# Patient Record
Sex: MALE | Race: WHITE
[De-identification: names, ages, dates, MRNs, and addresses within clinical notes are randomized per-mention and may not be internally consistent; named-entity substitution may affect disease eponyms.]

---

## 2017-04-17 ENCOUNTER — HOSPITAL ENCOUNTER (OUTPATIENT)
Dept: HOSPITAL 47 - RADCTMAIN | Age: 47
End: 2017-04-17
Payer: COMMERCIAL

## 2017-04-17 DIAGNOSIS — J45.40: Primary | ICD-10-CM

## 2017-04-17 PROCEDURE — 71275 CT ANGIOGRAPHY CHEST: CPT

## 2017-04-17 NOTE — CT
CT CHEST FOR PULMONARY EMBOLISM.

 

EXAMINATION TYPE: CT angio chest

 

DATE OF EXAM: 4/17/2017 12:10 PM

 

INDICATION: Moderate persistent asthma

 

CT DLP: 1230.5 mGycm, Automated exposure control for dose reduction was used.

 

CONTRAST: Patient injected with 100 mL of Omnipaque 350.

 

COMPARISON: NONE

 

TECHNIQUE: CT of the chest is performed on a spiral scan at 2 mm thick sections.  Study is performed 
with intravenous contrast timed for evaluation for pulmonary embolism.  This will limit additional po
rtions of the evaluation.  3-D MIP images reconstructed by the technologist are reviewed on the compu
ter in the coronal and sagittal planes. 

 

FINDINGS:

No persistent filling defects are evident to suggest an acute pulmonary embolism.

 

No mediastinal or hilar adenopathy enlarged by CT criteria is evident.  The ascending aorta diameter 
at the level of the main pulmonary artery is 4.1 cm.  The main pulmonary artery diameter at the bifur
cation is 2.2 cm.

 

Streak opacity within the right middle lobe is likely related atelectasis.

 

Limited CT section through the upper abdomen are unremarkable.

 

IMPRESSIONS:

1. No acute pulmonary embolism.

2. Prominent ascending thoracic aorta 4.1 cm

## 2017-06-29 ENCOUNTER — HOSPITAL ENCOUNTER (OUTPATIENT)
Dept: HOSPITAL 47 - LABWHC1 | Age: 47
Discharge: HOME | End: 2017-06-29
Payer: COMMERCIAL

## 2017-06-29 DIAGNOSIS — J45.50: Primary | ICD-10-CM

## 2017-06-29 LAB
ALP SERPL-CCNC: 99 U/L (ref 38–126)
ALT SERPL-CCNC: 45 U/L (ref 21–72)
ANION GAP SERPL CALC-SCNC: 9 MMOL/L
AST SERPL-CCNC: 22 U/L (ref 17–59)
BASOPHILS # BLD AUTO: 0.1 K/UL (ref 0–0.2)
BASOPHILS NFR BLD AUTO: 0 %
BUN SERPL-SCNC: 12 MG/DL (ref 9–20)
CALCIUM SPEC-MCNC: 9.7 MG/DL (ref 8.4–10.2)
CH: 28.1
CHCM: 33.4
CHLORIDE SERPL-SCNC: 104 MMOL/L (ref 98–107)
CO2 SERPL-SCNC: 28 MMOL/L (ref 22–30)
EOSINOPHIL # BLD AUTO: 0.1 K/UL (ref 0–0.7)
EOSINOPHIL NFR BLD AUTO: 1 %
ERYTHROCYTE [DISTWIDTH] IN BLOOD BY AUTOMATED COUNT: 4.48 M/UL (ref 4.3–5.9)
ERYTHROCYTE [DISTWIDTH] IN BLOOD: 14.8 % (ref 11.5–15.5)
GLUCOSE SERPL-MCNC: 87 MG/DL (ref 74–99)
HCT VFR BLD AUTO: 37.8 % (ref 39–53)
HDW: 2.91
HGB BLD-MCNC: 12.7 GM/DL (ref 13–17.5)
LUC NFR BLD AUTO: 1 %
LYMPHOCYTES # SPEC AUTO: 1.8 K/UL (ref 1–4.8)
LYMPHOCYTES NFR SPEC AUTO: 16 %
MCH RBC QN AUTO: 28.2 PG (ref 25–35)
MCHC RBC AUTO-ENTMCNC: 33.5 G/DL (ref 31–37)
MCV RBC AUTO: 84.2 FL (ref 80–100)
MONOCYTES # BLD AUTO: 0.6 K/UL (ref 0–1)
MONOCYTES NFR BLD AUTO: 5 %
NEUTROPHILS # BLD AUTO: 8.9 K/UL (ref 1.3–7.7)
NEUTROPHILS NFR BLD AUTO: 77 %
NON-AFRICAN AMERICAN GFR(MDRD): >60
POTASSIUM SERPL-SCNC: 4.1 MMOL/L (ref 3.5–5.1)
PROT SERPL-MCNC: 6.4 G/DL (ref 6.3–8.2)
SODIUM SERPL-SCNC: 141 MMOL/L (ref 137–145)
WBC # BLD AUTO: 0.16 10*3/UL
WBC # BLD AUTO: 11.6 K/UL (ref 3.8–10.6)
WBC (PEROX): 11.84

## 2017-06-29 PROCEDURE — 86003 ALLG SPEC IGE CRUDE XTRC EA: CPT

## 2017-06-29 PROCEDURE — 85025 COMPLETE CBC W/AUTO DIFF WBC: CPT

## 2017-06-29 PROCEDURE — 82785 ASSAY OF IGE: CPT

## 2017-06-29 PROCEDURE — 36415 COLL VENOUS BLD VENIPUNCTURE: CPT

## 2017-06-29 PROCEDURE — 85652 RBC SED RATE AUTOMATED: CPT

## 2017-06-29 PROCEDURE — 84439 ASSAY OF FREE THYROXINE: CPT

## 2017-06-29 PROCEDURE — 80053 COMPREHEN METABOLIC PANEL: CPT

## 2017-06-29 PROCEDURE — 84443 ASSAY THYROID STIM HORMONE: CPT

## 2017-06-29 PROCEDURE — 85008 BL SMEAR W/O DIFF WBC COUNT: CPT

## 2017-06-30 LAB
A ALTERNATA IGE QN: 8.34 KU/L (ref ?–0.35)
A FUMIGATUS IGE QN: 0.1 KU/L
A FUMIGATUS IGE QN: <0.35 KU/L (ref ?–0.35)
A FUMIGATUS IGE RAST: (no result)
C HERBARUM IGE QN: <0.35 KU/L (ref ?–0.35)
C HERBARUM IGE RAST: (no result)
CAT DANDER IGE QN: (no result)
CAT DANDER IGE QN: 2.11 KU/L (ref ?–0.35)
D FARINAE IGE QN: (no result)
D FARINAE IGE QN: 0.6 KU/L (ref ?–0.35)
D PTERONYSS IGE QN: 0.47 KU/L (ref ?–0.35)
D PTERONYSS IGE RAST: (no result)
DEPRECATED COMMON RAGWEED IGE RAST QL: (no result)
IGE SERPL-ACNC: 99.6 IU/ML (ref ?–114)
MAPLE IGE RAST: (no result)
MARSH ELDER IGE QN: (no result)
MARSH ELDER IGE QN: <0.35 KU/L (ref ?–0.35)
MISC ALLERGEN IGE RAST: (no result)
MOUSE URINE PROT IGE QN: <0.35 KU/L (ref ?–0.35)
MOUSE URINE PROT IGE RAST: (no result)
MT JUNIPER IGE QN: (no result)
MULBERRY IGE CLASS: (no result)
NETTLE IGE RAST: (no result)
P NOTATUM IGE QN: (no result)
SILVER BIRCH IGE RAST: (no result)
WHITE ASH IGE RAST: (no result)

## 2017-11-16 ENCOUNTER — HOSPITAL ENCOUNTER (OUTPATIENT)
Dept: HOSPITAL 47 - RADCTMAIN | Age: 47
Discharge: HOME | End: 2017-11-16
Payer: COMMERCIAL

## 2017-11-16 DIAGNOSIS — I71.2: Primary | ICD-10-CM

## 2017-11-16 DIAGNOSIS — M79.89: ICD-10-CM

## 2017-11-16 PROCEDURE — 93306 TTE W/DOPPLER COMPLETE: CPT

## 2017-11-16 PROCEDURE — 71275 CT ANGIOGRAPHY CHEST: CPT

## 2017-11-16 PROCEDURE — 93970 EXTREMITY STUDY: CPT

## 2017-11-17 NOTE — ECHOF
Referral Reason:R06.02 Dyspnea M79.89 Swelling in legs



MEASUREMENTS

--------

HEIGHT: 182.9 cm

WEIGHT: 145.1 kg

BP: 

RVIDd:   2.9 cm     (< 3.3)

IVSd:   1.1 cm     (0.6 - 1.1)

LVIDd:   5.5 cm     (3.9 - 5.3)

LVPWd:   1.0 cm     (0.6 - 1.1)

IVSs:   1.6 cm

LVIDs:   4.2 cm

LVPWs:   1.2 cm

LA Diam:   3.4 cm     (2.7 - 3.8)

LAESV Index (A-L):   28.60 ml/m

Ao Diam:   3.7 cm     (2.0 - 3.7)

AV Cusp:   2.5 cm     (1.5 - 2.6)

LA Diam:   3.9 cm     (2.7 - 3.8)

MV EXCURSION:   21.475 mm     (> 18.000)

MV EF SLOPE:   135 mm/s     (70 - 150)

EPSS:   0.6 cm

MV E Abrahan:   0.90 m/s

MV DecT:   234 ms

MV A Abrahan:   0.88 m/s

MV E/A Ratio:   1.02 

RAP:   5.00 mmHg

RVSP:   20.05 mmHg







FINDINGS

--------

Sinus rhythm.

Morbid Obesity

LV size, wall thickness and systolic function are normal, with an EF greater than 55%.   The left andry
tricular size is normal.

The right ventricle is normal in size.

Normal LA  size by volume 22+/-6 ml/m2.

The right atrial size is normal.

The aortic valve is trileaflet, and appears structurally normal. No aortic stenosis or regurgitation.


Mild mitral regurgitation is present.

Mild tricuspid regurgitation present.   There is no evidence of pulmonary hypertension.   The right v
entricular systolic pressure, as measured by Doppler, is 20.05mmHg.

There is no pulmonic regurgitation present.

The aortic root size is normal.

Echo free space represents a pericardial fat pad.



CONCLUSIONS

--------

1. Morbid Obesity

2. LV size, wall thickness and systolic function are normal, with an EF greater than 55%.

3. The left ventricular size is normal.

4. The aortic valve is trileaflet, and appears structurally normal. No aortic stenosis or regurgitati
on.

5. Mild mitral regurgitation is present.

6. Mild tricuspid regurgitation present.

7. There is no evidence of pulmonary hypertension.

8. The right ventricular systolic pressure, as measured by Doppler, is 20.05mmHg.

9. Echo free space represents a pericardial fat pad.





SONOGRAPHER: Latesha Constantino RDCS

## 2020-01-06 ENCOUNTER — HOSPITAL ENCOUNTER (OUTPATIENT)
Dept: HOSPITAL 47 - LABWHC1 | Age: 50
Discharge: HOME | End: 2020-01-06
Attending: PHYSICAL MEDICINE & REHABILITATION
Payer: COMMERCIAL

## 2020-01-06 DIAGNOSIS — M47.816: ICD-10-CM

## 2020-01-06 DIAGNOSIS — M43.16: ICD-10-CM

## 2020-01-06 DIAGNOSIS — M51.36: Primary | ICD-10-CM

## 2020-01-06 DIAGNOSIS — R20.2: ICD-10-CM

## 2020-01-06 DIAGNOSIS — M41.86: ICD-10-CM

## 2020-01-06 LAB
ALBUMIN SERPL-MCNC: 4.3 G/DL (ref 3.8–4.9)
ALBUMIN/GLOB SERPL: 2.15 G/DL (ref 1.6–3.17)
ALP SERPL-CCNC: 121 U/L (ref 41–126)
ALT SERPL-CCNC: 21 U/L (ref 10–49)
ANION GAP SERPL CALC-SCNC: 7.2 MMOL/L (ref 4–12)
ASO AB SER QL: 32 IU/ML (ref 0–200)
AST SERPL-CCNC: 17 U/L (ref 14–35)
BASOPHILS # BLD AUTO: 0 K/UL (ref 0–0.2)
BASOPHILS NFR BLD AUTO: 0 %
BUN SERPL-SCNC: 16 MG/DL (ref 9–27)
BUN/CREAT SERPL: 14.55 RATIO (ref 12–20)
CALCIUM SPEC-MCNC: 9.1 MG/DL (ref 8.7–10.3)
CCP IGG SERPL-ACNC: <0.5 U/ML
CHLORIDE SERPL-SCNC: 101 MMOL/L (ref 96–109)
CK SERPL-CCNC: 70 U/L (ref 35–257)
CO2 SERPL-SCNC: 29.8 MMOL/L (ref 21.6–31.8)
EOSINOPHIL # BLD AUTO: 0.2 K/UL (ref 0–0.7)
EOSINOPHIL NFR BLD AUTO: 2 %
ERYTHROCYTE [DISTWIDTH] IN BLOOD BY AUTOMATED COUNT: 5.41 M/UL (ref 4.3–5.9)
ERYTHROCYTE [DISTWIDTH] IN BLOOD: 13.9 % (ref 11.5–15.5)
GLOBULIN SER CALC-MCNC: 2 G/DL (ref 1.6–3.3)
GLUCOSE SERPL-MCNC: 96 MG/DL (ref 70–110)
HBA1C MFR BLD: 5.7 % (ref 4–6)
HCT VFR BLD AUTO: 43 % (ref 39–53)
HGB BLD-MCNC: 13.5 GM/DL (ref 13–17.5)
LDH SPEC-CCNC: 183 U/L (ref 120–246)
LYMPHOCYTES # SPEC AUTO: 1.8 K/UL (ref 1–4.8)
LYMPHOCYTES NFR SPEC AUTO: 20 %
MCH RBC QN AUTO: 24.9 PG (ref 25–35)
MCHC RBC AUTO-ENTMCNC: 31.3 G/DL (ref 31–37)
MCV RBC AUTO: 79.5 FL (ref 80–100)
MONOCYTES # BLD AUTO: 0.5 K/UL (ref 0–1)
MONOCYTES NFR BLD AUTO: 5 %
NEUTROPHILS # BLD AUTO: 6.3 K/UL (ref 1.3–7.7)
NEUTROPHILS NFR BLD AUTO: 70 %
PH UR: 6 [PH] (ref 5–8)
PLATELET # BLD AUTO: 238 K/UL (ref 150–450)
POTASSIUM SERPL-SCNC: 4.6 MMOL/L (ref 3.5–5.5)
PROT SERPL-MCNC: 6.3 G/DL (ref 6.2–8.2)
RHEUMATOID FACT SERPL-ACNC: 5 IU/ML (ref 0–15)
SODIUM SERPL-SCNC: 138 MMOL/L (ref 135–145)
SP GR UR: 1.02 (ref 1–1.03)
T4 FREE SERPL-MCNC: 1.1 NG/DL (ref 0.8–1.8)
URATE SERPL-MCNC: 6.6 MG/DL (ref 3.7–8.7)
UROBILINOGEN UR QL STRIP: <2 MG/DL (ref ?–2)
VIT B12 SERPL-MCNC: 532 PG/ML (ref 200–944)
WBC # BLD AUTO: 9 K/UL (ref 3.8–10.6)

## 2020-01-06 PROCEDURE — 83655 ASSAY OF LEAD: CPT

## 2020-01-06 PROCEDURE — 86812 HLA TYPING A B OR C: CPT

## 2020-01-06 PROCEDURE — 87390 HIV-1 AG IA: CPT

## 2020-01-06 PROCEDURE — 83970 ASSAY OF PARATHORMONE: CPT

## 2020-01-06 PROCEDURE — 83825 ASSAY OF MERCURY: CPT

## 2020-01-06 PROCEDURE — 84439 ASSAY OF FREE THYROXINE: CPT

## 2020-01-06 PROCEDURE — 80053 COMPREHEN METABOLIC PANEL: CPT

## 2020-01-06 PROCEDURE — 83036 HEMOGLOBIN GLYCOSYLATED A1C: CPT

## 2020-01-06 PROCEDURE — 84425 ASSAY OF VITAMIN B-1: CPT

## 2020-01-06 PROCEDURE — 83516 IMMUNOASSAY NONANTIBODY: CPT

## 2020-01-06 PROCEDURE — 84207 ASSAY OF VITAMIN B-6: CPT

## 2020-01-06 PROCEDURE — 86060 ANTISTREPTOLYSIN O TITER: CPT

## 2020-01-06 PROCEDURE — 82553 CREATINE MB FRACTION: CPT

## 2020-01-06 PROCEDURE — 86803 HEPATITIS C AB TEST: CPT

## 2020-01-06 PROCEDURE — 36415 COLL VENOUS BLD VENIPUNCTURE: CPT

## 2020-01-06 PROCEDURE — 82550 ASSAY OF CK (CPK): CPT

## 2020-01-06 PROCEDURE — 86038 ANTINUCLEAR ANTIBODIES: CPT

## 2020-01-06 PROCEDURE — 82306 VITAMIN D 25 HYDROXY: CPT

## 2020-01-06 PROCEDURE — 83615 LACTATE (LD) (LDH) ENZYME: CPT

## 2020-01-06 PROCEDURE — 86431 RHEUMATOID FACTOR QUANT: CPT

## 2020-01-06 PROCEDURE — 86618 LYME DISEASE ANTIBODY: CPT

## 2020-01-06 PROCEDURE — 86235 NUCLEAR ANTIGEN ANTIBODY: CPT

## 2020-01-06 PROCEDURE — 82652 VIT D 1 25-DIHYDROXY: CPT

## 2020-01-06 PROCEDURE — 82607 VITAMIN B-12: CPT

## 2020-01-06 PROCEDURE — 82175 ASSAY OF ARSENIC: CPT

## 2020-01-06 PROCEDURE — 84443 ASSAY THYROID STIM HORMONE: CPT

## 2020-01-06 PROCEDURE — 84100 ASSAY OF PHOSPHORUS: CPT

## 2020-01-06 PROCEDURE — 82310 ASSAY OF CALCIUM: CPT

## 2020-01-06 PROCEDURE — 85025 COMPLETE CBC W/AUTO DIFF WBC: CPT

## 2020-01-06 PROCEDURE — 86140 C-REACTIVE PROTEIN: CPT

## 2020-01-06 PROCEDURE — 82570 ASSAY OF URINE CREATININE: CPT

## 2020-01-06 PROCEDURE — 85652 RBC SED RATE AUTOMATED: CPT

## 2020-01-06 PROCEDURE — 81003 URINALYSIS AUTO W/O SCOPE: CPT

## 2020-01-06 PROCEDURE — 86200 CCP ANTIBODY: CPT

## 2020-01-06 PROCEDURE — 84165 PROTEIN E-PHORESIS SERUM: CPT

## 2020-01-06 PROCEDURE — 82164 ANGIOTENSIN I ENZYME TEST: CPT

## 2020-01-06 PROCEDURE — 84550 ASSAY OF BLOOD/URIC ACID: CPT

## 2020-01-06 PROCEDURE — 86780 TREPONEMA PALLIDUM: CPT

## 2020-01-07 LAB
1,25(OH)2D3 SERPL-MCNC: 49 PG/ML (ref 20–79)
ACE SERPL-CCNC: 46 U/L (ref 8–52)
ALBUMIN SERPL ELPH-MCNC: 3.76 G/DL (ref 3.8–4.9)
GAMMA GLOB SERPL ELPH-MCNC: 0.96 G/DL (ref 0.7–1.5)
HLA-B27 QL: NEGATIVE

## 2020-10-02 ENCOUNTER — HOSPITAL ENCOUNTER (OUTPATIENT)
Dept: HOSPITAL 47 - LABWHC1 | Age: 50
Discharge: HOME | End: 2020-10-02
Attending: INTERNAL MEDICINE
Payer: COMMERCIAL

## 2020-10-02 DIAGNOSIS — J45.50: Primary | ICD-10-CM

## 2020-10-02 LAB
BASOPHILS # BLD AUTO: 0.1 K/UL (ref 0–0.2)
BASOPHILS NFR BLD AUTO: 1 %
EOSINOPHIL # BLD AUTO: 0.2 K/UL (ref 0–0.7)
EOSINOPHIL # BLD: 230 #EOS/UL (ref 150–300)
EOSINOPHIL NFR BLD AUTO: 2 %
ERYTHROCYTE [DISTWIDTH] IN BLOOD BY AUTOMATED COUNT: 5.67 M/UL (ref 4.3–5.9)
ERYTHROCYTE [DISTWIDTH] IN BLOOD: 15.5 % (ref 11.5–15.5)
HCT VFR BLD AUTO: 45.1 % (ref 39–53)
HGB BLD-MCNC: 14 GM/DL (ref 13–17.5)
LYMPHOCYTES # SPEC AUTO: 1.8 K/UL (ref 1–4.8)
LYMPHOCYTES NFR SPEC AUTO: 15 %
MCH RBC QN AUTO: 24.7 PG (ref 25–35)
MCHC RBC AUTO-ENTMCNC: 31.1 G/DL (ref 31–37)
MCV RBC AUTO: 79.4 FL (ref 80–100)
MONOCYTES # BLD AUTO: 0.7 K/UL (ref 0–1)
MONOCYTES NFR BLD AUTO: 6 %
NEUTROPHILS # BLD AUTO: 8.6 K/UL (ref 1.3–7.7)
NEUTROPHILS NFR BLD AUTO: 75 %
PLATELET # BLD AUTO: 229 K/UL (ref 150–450)
WBC # BLD AUTO: 11.5 K/UL (ref 3.8–10.6)

## 2020-10-02 PROCEDURE — 36415 COLL VENOUS BLD VENIPUNCTURE: CPT

## 2020-10-02 PROCEDURE — 85008 BL SMEAR W/O DIFF WBC COUNT: CPT

## 2020-10-02 PROCEDURE — 85025 COMPLETE CBC W/AUTO DIFF WBC: CPT

## 2020-10-02 PROCEDURE — 82785 ASSAY OF IGE: CPT

## 2021-03-19 ENCOUNTER — HOSPITAL ENCOUNTER (OUTPATIENT)
Dept: HOSPITAL 47 - RADMRIMAIN | Age: 51
End: 2021-03-19
Attending: STUDENT IN AN ORGANIZED HEALTH CARE EDUCATION/TRAINING PROGRAM
Payer: COMMERCIAL

## 2021-03-19 DIAGNOSIS — M17.12: ICD-10-CM

## 2021-03-19 DIAGNOSIS — M25.762: ICD-10-CM

## 2021-03-19 DIAGNOSIS — S83.232A: Primary | ICD-10-CM

## 2021-03-19 DIAGNOSIS — M71.22: ICD-10-CM

## 2021-03-20 NOTE — MR
EXAMINATION TYPE: MR knee LT wo con

 

DATE OF EXAM: 3/19/2021

 

COMPARISON: Knee pain

 

HISTORY: Left knee pain for 6-8 months.

 

Multiplanar multiecho imaging of the left knee was performed with no contrast.

 

The anterior and posterior cruciate ligaments are intact. There is a large knee joint effusion. The c
ollateral ligaments are intact. There is narrowing of the medial joint space. There is posterior and 
medial displacement of the medial meniscus. There is significant thinning of the posterior horn media
l meniscus. Lateral meniscus appears fairly normal. The patella is intact. I see no fracture. There i
s slight increased signal in the medial aspect medial femoral and tibial condyles on the T2 images co
nsistent with edema. There is 6 x 2 cm popliteal cyst.

 

IMPRESSION:

Complex tear of the posterior horn medial meniscus. Osteoarthritic narrowing medial joint space with 
spur formation. Mild edema in the medial femoral and tibial condyles.

 

Large knee joint effusion with popliteal cyst. No fracture seen.

## 2021-03-24 NOTE — US
"Spoke with Henna. Pt sometimes has SOA. B/P and HR are \"ok\".    Spoke with Dr. Ng. Ok to add to a surgery clearance appt spot.    Spoke with Henna. Scheduled to see JK Friday, April 2nd. She verbalized understanding.    Thank you,    Andreea Howell, RN  Triage [facility] [place]            " EXAMINATION TYPE: US venous doppler duplex LE 

 

DATE OF EXAM: 11/16/2017 11:50 AM

 

COMPARISON: NONE

 

CLINICAL HISTORY: R06.02 Dyspnea M79.89 Swelling in legs.

 

SIDE PERFORMED: Bilateral  

 

TECHNIQUE:  The lower extremity deep venous system is examined utilizing real time linear array sonog
reggie with graded compression, doppler sonography and color-flow sonography.

 

VESSELS IMAGED:

External Iliac Vein (EIV)

Common Femoral Vein

Deep Femoral Vein

Greater Saphenous Vein *

Femoral Vein

Popliteal Vein

Small Saphenous Vein *

Proximal Calf Veins

(* superficial vessels)

 

Right Leg:  Negative for DVT

 

Left Leg:  Negative for DVT

 

Grayscale, color doppler, spectral doppler imaging performed of the deep veins of the lower extremiti
es.  There is normal flow, compressibility, vascular waveforms.

 

IMPRESSION:  No sonographic evidence of deep venous thrombosis within either lower extremity.

## 2021-05-09 NOTE — CT
CT CHEST FOR PULMONARY EMBOLISM.

 

EXAMINATION TYPE: CT angio chest

 

DATE OF EXAM: 11/16/2017

 

INDICATION: Dyspnea and Edema

 

CT DLP: 632 mGycm, Automated exposure control for dose reduction was used.

 

CONTRAST: Patient injected with 100 ml mL of Omnipaque 350.

 

COMPARISON: 4/17/2017

 

TECHNIQUE: CT of the chest is performed on a spiral scan at 2 mm thick sections.  Study is performed 
with intravenous contrast timed for evaluation for pulmonary embolism.  This will limit additional po
rtions of the evaluation.  3-D MIP images reconstructed by the technologist are reviewed on the compu
ter in the coronal and sagittal planes. 

 

FINDINGS:

No persistent filling defects are evident to suggest an acute pulmonary embolism.

 

No mediastinal or hilar adenopathy enlarged by CT criteria is evident.  The ascending aorta diameter 
at the level of the main pulmonary artery is 4.5 cm.  The main pulmonary artery diameter at the bifur
cation is 2.9 cm.

 

Some stranding is within the right middle lobe. Some mild atelectasis may be present. Minimal streak 
atelectasis posterior left base.

 

Limited CT section through the upper abdomen are unremarkable.

 

IMPRESSIONS:

1. Ascending thoracic aortic aneurysm measuring 4.5 cm.  This is essentially stable from 4/17/2017.

2. No acute pulmonary embolism. no

## 2022-09-19 ENCOUNTER — HOSPITAL ENCOUNTER (OUTPATIENT)
Dept: HOSPITAL 47 - LABPAT | Age: 52
Discharge: HOME | End: 2022-09-19
Attending: ORTHOPAEDIC SURGERY
Payer: COMMERCIAL

## 2022-09-19 DIAGNOSIS — Z01.812: Primary | ICD-10-CM

## 2022-09-19 DIAGNOSIS — Z22.322: ICD-10-CM

## 2022-09-19 DIAGNOSIS — M17.12: ICD-10-CM

## 2022-09-19 PROCEDURE — 87070 CULTURE OTHR SPECIMN AEROBIC: CPT

## 2022-10-26 ENCOUNTER — HOSPITAL ENCOUNTER (OUTPATIENT)
Dept: HOSPITAL 47 - OR | Age: 52
Setting detail: OBSERVATION
LOS: 2 days | Discharge: HOME HEALTH | End: 2022-10-28
Attending: ORTHOPAEDIC SURGERY | Admitting: ORTHOPAEDIC SURGERY
Payer: COMMERCIAL

## 2022-10-26 VITALS — BODY MASS INDEX: 38.6 KG/M2

## 2022-10-26 DIAGNOSIS — Z79.899: ICD-10-CM

## 2022-10-26 DIAGNOSIS — M17.12: Primary | ICD-10-CM

## 2022-10-26 DIAGNOSIS — J45.909: ICD-10-CM

## 2022-10-26 LAB — GLUCOSE BLD-MCNC: 98 MG/DL (ref 70–110)

## 2022-10-26 PROCEDURE — 96374 THER/PROPH/DIAG INJ IV PUSH: CPT

## 2022-10-26 PROCEDURE — 97530 THERAPEUTIC ACTIVITIES: CPT

## 2022-10-26 PROCEDURE — 64448 NJX AA&/STRD FEM NRV NFS IMG: CPT

## 2022-10-26 PROCEDURE — 97161 PT EVAL LOW COMPLEX 20 MIN: CPT

## 2022-10-26 PROCEDURE — 64999 UNLISTED PX NERVOUS SYSTEM: CPT

## 2022-10-26 PROCEDURE — 85025 COMPLETE CBC W/AUTO DIFF WBC: CPT

## 2022-10-26 PROCEDURE — 96372 THER/PROPH/DIAG INJ SC/IM: CPT

## 2022-10-26 PROCEDURE — 76942 ECHO GUIDE FOR BIOPSY: CPT

## 2022-10-26 PROCEDURE — 27447 TOTAL KNEE ARTHROPLASTY: CPT

## 2022-10-26 PROCEDURE — 88300 SURGICAL PATH GROSS: CPT

## 2022-10-26 PROCEDURE — 96376 TX/PRO/DX INJ SAME DRUG ADON: CPT

## 2022-10-26 PROCEDURE — 97116 GAIT TRAINING THERAPY: CPT

## 2022-10-26 PROCEDURE — 73560 X-RAY EXAM OF KNEE 1 OR 2: CPT

## 2022-10-26 RX ADMIN — DOCUSATE SODIUM AND SENNOSIDES SCH EACH: 50; 8.6 TABLET ORAL at 19:41

## 2022-10-26 RX ADMIN — POTASSIUM CHLORIDE SCH MLS: 14.9 INJECTION, SOLUTION INTRAVENOUS at 07:28

## 2022-10-26 RX ADMIN — HYDROMORPHONE HYDROCHLORIDE PRN MG: 1 INJECTION, SOLUTION INTRAMUSCULAR; INTRAVENOUS; SUBCUTANEOUS at 21:46

## 2022-10-26 RX ADMIN — MEPERIDINE HYDROCHLORIDE ONE MG: 50 INJECTION, SOLUTION INTRAMUSCULAR; INTRAVENOUS; SUBCUTANEOUS at 10:13

## 2022-10-26 RX ADMIN — FENTANYL CITRATE ONE MCG: 50 INJECTION, SOLUTION INTRAMUSCULAR; INTRAVENOUS at 11:26

## 2022-10-26 RX ADMIN — MEPERIDINE HYDROCHLORIDE ONE MG: 50 INJECTION, SOLUTION INTRAMUSCULAR; INTRAVENOUS; SUBCUTANEOUS at 09:56

## 2022-10-26 RX ADMIN — DOCUSATE SODIUM AND SENNOSIDES SCH EACH: 50; 8.6 TABLET ORAL at 16:54

## 2022-10-26 RX ADMIN — MIDAZOLAM ONE MG: 1 INJECTION INTRAMUSCULAR; INTRAVENOUS at 10:45

## 2022-10-26 RX ADMIN — MIDAZOLAM ONE MG: 1 INJECTION INTRAMUSCULAR; INTRAVENOUS at 10:39

## 2022-10-26 RX ADMIN — HYDROCODONE BITARTRATE AND ACETAMINOPHEN PRN EACH: 10; 325 TABLET ORAL at 19:41

## 2022-10-26 RX ADMIN — MIDAZOLAM ONE MG: 1 INJECTION INTRAMUSCULAR; INTRAVENOUS at 10:26

## 2022-10-26 RX ADMIN — HYDROMORPHONE HYDROCHLORIDE PRN MG: 1 INJECTION, SOLUTION INTRAMUSCULAR; INTRAVENOUS; SUBCUTANEOUS at 21:39

## 2022-10-26 RX ADMIN — HYDROMORPHONE HYDROCHLORIDE PRN MG: 1 INJECTION, SOLUTION INTRAMUSCULAR; INTRAVENOUS; SUBCUTANEOUS at 16:54

## 2022-10-26 RX ADMIN — FENTANYL CITRATE ONE MCG: 50 INJECTION, SOLUTION INTRAMUSCULAR; INTRAVENOUS at 11:39

## 2022-10-26 NOTE — P.ANPRN
Procedure Note - Anesthesia





- Nerve Block Performed


  ** Left iPack


Time Out Performed: Yes


Date of Procedure: 10/26/22


Procedure Start Time: 07:11


Procedure Stop Time: 07:18


Location of Patient: PreOp


Indication: Acute Post-Operative Pain, Requested by Surgeon (Dr Bates)


Sedation Type: Sedate with meaningful contact maintained


Preparation: Sterile Prep


Position: Supine


Catheter: None


Needle Types: Pajunk


Needle Gauge: 21


Ultrasound used to visualize needle placement: Yes


Ultrasound used to observe medication spread: Yes


Injectate: 0.5% Ropivacaine (see comment for volume) (15cc +5cc PF normal 

saline)


Blood Aspirated: No


Pain Paresthesia on Injection Noted: No


Resistance on Injection: Normal


Image Stored and Saved: Yes


Events: Uneventful and Well Tolerated

## 2022-10-26 NOTE — XR
EXAMINATION TYPE: XR knee limited LT

 

DATE OF EXAM: 10/26/2022

 

COMPARISON: 1/20/2014

 

HISTORY: Postop left knee replacement

 

TECHNIQUE: 2 view left knee

 

FINDINGS: Tibial and femoral components of place. Postsurgical soft tissue changes are evident. No ac
[place] fracture or dislocation is evident.

 

IMPRESSION:

1.  No acute fractures post left knee replacement.

## 2022-10-26 NOTE — P.OP
Date of Procedure: 10/26/22


Preoperative Diagnosis: 


Left knee osteoarthritis


Postoperative Diagnosis: 


Left knee osteoarthritis


Procedure(s) Performed: 


Left total knee arthroplasty


Implants: 


1.  Depuy attune size 7 left cruciate retaining cemented femur


2.  Depuy attune size 7 fixed bearing cemented tibial baseplate


3.  Depuy attune size 7 fixed bearing cruciate retaining 10 mm polyethylene 

tibial insert


4.  Depuy attune 41 mm all polyethylene cemented patella


Anesthesia: GETA, regional (Adductor canal catheter, Ipack block)


Surgeon: Juanito Bates


Assistant #1: Abdulaziz Mcconnell


Estimated Blood Loss (ml): 50


Pathology: other (Bone)


Condition: stable


Disposition: PACU


Indications for Procedure: 


52-year-old patient seen with symptomatic left knee osteoarthritis.  After 

treatment options were discussed, he elected to proceed with total knee arth

roplasty.


Operative Findings: 


See description of procedure


Description of Procedure: 


Patient was taken to the operative suite after having an adductor canal catheter

placed by the department of anesthesia.  Patient underwent a general anesthetic 

by the department of anesthesia.  Patient was given preoperative IV intake 

antibiotics and TXA.  A well-padded tourniquet was placed about the [] lower 

extremity.  The lower extremity was then prepped and draped in the normal 

sterile orthopedic fashion.  The extremity was elevated, a tourniquet was in

sufflated to 300.  A standard anterior incision was made sharply through skin.  

Dissection was taken down through the subcutaneous soft tissues down to the 

extensor mechanism.  A medial arthrotomy was performed, patella was everted and 

knee was flexed.  There was advanced osteoarthritis noted.  I introduced my 

distal intramedullary femoral drill.  I then introduced the distal femoral 

cutting jig.  Florentin NASCIMENTO secured the cutting jig with 2 pins.  I held 

retractors in position while Florentin NASCIMENTO performed the distal femoral 

resection through the guide area we now removed her distal femoral cutting 

guide.  We now placed our 4-in-1 femoral cutting block and positioned and it was

 secured with 2 pins by Florentin NASCIMENTO while I held the block in position.  The 

distal femoral finishing was now completed.  A proximal tibial cutting guide was

 positioned.  I held the guide in the appropriate position with both hands well 

Florentin NASCIMENTO inserted stabilizing pins into the guide.  Proximal tibial cut 

was made. We now placed a trial femoral component into position, along with an 

appropriate size tibial tray and insert.  We now took the knee through range of 

motion and had full extension good flexion and good overall soft tissue balance 

noted.  The patella was everted and stabilized with 2 towel clips held by Florentin NASCIMENTO while I performed a flush with patellar quad tendon utilizing a fresh 

sawblade.  We templated the patella, appropriate drill holes were made.  An 

appropriate trial patella was positioned, knee was taken through full range of 

motion with the patella tracking very nicely.  The trial patella was removed.  

Drill holes were made through the femoral component.  All trial components were 

removed after marking off the appropriate rotation of the tibia.  Retractors 

were now positioned along the proximal tibia.  An appropriate keel punch was 

made with the appropriate size tibial guide by myself on Florentin NASCIMENTO assisted

 by holding retractors.  At this point appropriate size implants were chosen and

 opened.  The joint was irrigated copiously with pulse lavage mechanical 

irrigation.  The wound was irrigated with pulse lavage mechanical irrigation.  

We mixed antibiotic methylmethacrylate.  We placed the knee into flexion.  We 

placed multiple retractors assisted by Florentin NASCIMENTO to expose the proximal 

tibia.  Once the methyl methacrylate was ready, the tibial component was 

cemented into place removing any excess methylmethacrylate form by both myself 

and Florentin NASCIMENTO.  The femoral component was cemented into place removing the 

removing any excess methylmethacrylate performed by both myself and Florentin NASCIMENTO.  We then inserted the appropriate size polyethylene tibial insert.  We made 

sure that it was locked into position.  We took the knee into full extension, 

and then back in a flexion making sure we had removed any excess 

methylmethacrylate.  The patellar component was then cemented down and secured 

with clamp.  Excess methylmethacrylate removed.  We kept the knee in full 

extension, patellar clamp in position until methylmethacrylate had hardened.  

Once it had hardened the patellar clamp was removed.  The knee was taken through

 full range of motion.  The patella tracked nicely.  There was good soft tissue 

balancing.  The tourniquet was now released.  Additional hemostasis was achieved

 via electrocautery.  A second gram of TXA was given.  The wound again was 

irrigated with pulse lavage mechanical irrigation.  The superficial soft tissues

 were infiltrated local analgesic.  The extensor mechanism was repaired with 

Ethibond suture.  We checked the repair with range of motion and it was stable. 

 The subcutaneous soft tissues were repaired with Vicryl in layers.  The skin 

was approximated with pernio/Dermabond.  Sterile dressings were applied followed

 by loose web roll and Ace bandage.  The patient was transferred to a bed, and 

taken to recovery in stable and satisfactory condition.  Florentin NASCIMENTO assisted

 with this complex procedure.

## 2022-10-26 NOTE — HP
HISTORY AND PHYSICAL



DATE OF SURGERY:

10/26/2022.



HISTORY OF PRESENT ILLNESS:

Kaushik Caro is a 52-year-old patient seen with symptomatic left knee osteoarthritis.  We

discussed options for treatment.  He elected to proceed with left total knee

arthroplasty.  Consent was obtained.  Medical clearance was provided.



PAST MEDICAL HISTORY:

Asthma.



PAST SURGICAL HISTORY:

Noncontributory.



DAILY MEDICATIONS:

1. Advair.

2. Prednisone.



SOCIAL HISTORY:

Denies tobacco use.



PHYSICAL EVALUATION OF THE LEFT KNEE:

Range of motion is -2/3 to 100.  Tenderness, medial joint line.  Crepitus, medial

patellofemoral compartments with range of motion.  Some pain with patellofemoral

compression. His ligaments are stable.  Hip rotation is without pain.  Distal

neurovascular exam is intact.



RADIOGRAPHS:

Radiographs of the left knee revealed severe osteoarthritic changes.



IMPRESSION:

1. Left knee osteoarthritis.

2. Asthma.



PLAN:

Left total knee arthroplasty.





MMODL / SALVATOREN: [id number] / Job#: [id number]

## 2022-10-26 NOTE — P.ANPRN
Procedure Note - Anesthesia





- Nerve Block Performed


  ** Left Adductor Canal


Time Out Performed: Yes (07:01)


Date of Procedure: 10/26/22


Procedure Start Time: 07:01


Procedure Stop Time: 07:10


Location of Patient: PreOp


Indication: Acute Post-Operative Pain, Requested by Surgeon (Dr Bates)


Sedation Type: Sedate with meaningful contact maintained


Preparation: Sterile Prep, Sterile Dressing


Position: Supine


Catheter: Indwelling


Needle Types: Pajunk


Needle Gauge: 21


Ultrasound used to visualize needle placement: Yes


Ultrasound used to observe medication spread: Yes


Injectate: 0.5% Ropivacaine (see comment for volume) (15cc)


Blood Aspirated: No


Pain Paresthesia on Injection Noted: No


Resistance on Injection: Normal


Image Stored and Saved: Yes


Events: Uneventful and Well Tolerated

## 2022-10-27 RX ADMIN — HYDROMORPHONE HYDROCHLORIDE PRN MG: 1 INJECTION, SOLUTION INTRAMUSCULAR; INTRAVENOUS; SUBCUTANEOUS at 08:55

## 2022-10-27 RX ADMIN — GABAPENTIN SCH MG: 300 CAPSULE ORAL at 12:43

## 2022-10-27 RX ADMIN — GABAPENTIN SCH MG: 300 CAPSULE ORAL at 16:05

## 2022-10-27 RX ADMIN — IBUPROFEN PRN MG: 800 TABLET, FILM COATED ORAL at 16:07

## 2022-10-27 RX ADMIN — GABAPENTIN SCH MG: 300 CAPSULE ORAL at 20:05

## 2022-10-27 RX ADMIN — POTASSIUM CHLORIDE SCH: 14.9 INJECTION, SOLUTION INTRAVENOUS at 19:18

## 2022-10-27 RX ADMIN — HEPARIN SODIUM SCH UNIT: 5000 INJECTION INTRAVENOUS; SUBCUTANEOUS at 20:05

## 2022-10-27 RX ADMIN — HYDROCODONE BITARTRATE AND ACETAMINOPHEN PRN EACH: 10; 325 TABLET ORAL at 04:19

## 2022-10-27 RX ADMIN — HYDROMORPHONE HYDROCHLORIDE PRN MG: 1 INJECTION, SOLUTION INTRAMUSCULAR; INTRAVENOUS; SUBCUTANEOUS at 05:40

## 2022-10-27 RX ADMIN — HEPARIN SODIUM SCH UNIT: 5000 INJECTION INTRAVENOUS; SUBCUTANEOUS at 08:55

## 2022-10-27 NOTE — P.PN
Progress Note - Text


Progress Note Date: 10/27/22 ([id number])


Anesthesiology


Postop day 1 status post total knee arthroplasty with adductor canal catheter.  

Patient doing well.  VAS 10 out of 10.  Gross strength intact in lower 

extremity.  Afebrile.  Denies alterations in sensorium.  .





Heart regular rate


Lungs nonlabored


Abdomen nondistended





Assessment: Postop day 1 status post total knee arthroplasty with adductor canal

catheter


Plan: All questions answered.  Maintain catheter 2 more days with patient 

removal at home.  Instructions were given at discharge.

## 2022-10-27 NOTE — P.PN
Subjective


Progress Note Date: 10/27/22


Principal diagnosis: 





Status post left total knee arthroplasty





Patient evaluated at bedside today, he is resting his hospital chair.  Pain 

control has been an issue since waking up from surgery, had a long discussion 

with him at bedside today that we would be changing up some of those 

medications.  The On-Q pain catheter is still in place.  He did get up with 

physical therapy and did okay.  He would like to restart his Motrin at this 

time.  He denies any headaches, lightheadedness, chest pain or shortness of 

breath.  He denies any issues with urination.  He is passing gas.  





Objective





- Vital Signs


Vital signs: 


                                   Vital Signs











Temp  98.8 F   10/27/22 07:26


 


Pulse  84   10/27/22 09:00


 


Resp  18   10/27/22 09:00


 


BP  191/97   10/27/22 07:26


 


Pulse Ox  98   10/27/22 07:26


 


FiO2      








                                 Intake & Output











 10/26/22 10/27/22 10/27/22





 18:59 06:59 18:59


 


Intake Total 2700  


 


Output Total 450 1300 


 


Balance 2250 -1300 


 


Weight 137.1 kg  


 


Intake:   


 


  IV 2600  


 


  Intake, IV Titration 100  





  Amount   


 


    ceFAZolin 3 gm In Sodium 100  





    Chloride 0.9% 100 ml @   





    200 mls/hr IVPB ONCE ONE   





    Rx#:[id number]   


 


Output:   


 


  Urine 400 1300 


 


  Estimated Blood Loss 50  


 


Other:   


 


  Voiding Method  Urinal Urinal


 


  # Voids  4 














- Exam





Lower extremity:





Incision is clean, dry, and intact.  The exofin fusion tape is in good 

condition.  There is minimal soft tissue swelling and ecchymosis surrounding the

medial and lateral aspects of the incision.  Calf is soft, no tenderness with 

palpation.  Plantar flexion, dorsiflexion, EHL, FHL are intact.  Sensory exam to

light touch throughout the extremity is intact, dorsal pedis pulses 2+.








Assessment and Plan


Assessment: 





Postoperative day #1 status post left total knee arthroplasty


Plan: 





Pain control, did discontinue Norco 10 mg/325 mg.  We will add gabapentin 300 mg

3 times a day along with ibuprofen 800 mg.  Patient will continue with the 

oxycodone 5 mg and Tylenol 1000 mg.  We will attempt to avoid Dilaudid, okay to 

use for severe pain





DVT prophylaxis, continue current medication





Icing and elevating techniques discussed





Encourage incentive spirometer





PT/OT





Medical recommendations





Discharge planning: It is made discharge home once pain is under control, will 

reassess on 10/28/2022


Time with Patient: Less than 30

## 2022-10-28 VITALS
SYSTOLIC BLOOD PRESSURE: 145 MMHG | HEART RATE: 95 BPM | DIASTOLIC BLOOD PRESSURE: 80 MMHG | RESPIRATION RATE: 15 BRPM | TEMPERATURE: 98.4 F

## 2022-10-28 LAB
BASOPHILS # BLD AUTO: 0 K/UL (ref 0–0.2)
BASOPHILS NFR BLD AUTO: 1 %
EOSINOPHIL # BLD AUTO: 0.2 K/UL (ref 0–0.7)
EOSINOPHIL NFR BLD AUTO: 2 %
ERYTHROCYTE [DISTWIDTH] IN BLOOD BY AUTOMATED COUNT: 4.22 M/UL (ref 4.3–5.9)
ERYTHROCYTE [DISTWIDTH] IN BLOOD: 14.1 % (ref 11.5–15.5)
HCT VFR BLD AUTO: 35.6 % (ref 39–53)
HGB BLD-MCNC: 12.1 GM/DL (ref 13–17.5)
LYMPHOCYTES # SPEC AUTO: 1.1 K/UL (ref 1–4.8)
LYMPHOCYTES NFR SPEC AUTO: 11 %
MCH RBC QN AUTO: 28.7 PG (ref 25–35)
MCHC RBC AUTO-ENTMCNC: 34.1 G/DL (ref 31–37)
MCV RBC AUTO: 84.2 FL (ref 80–100)
MONOCYTES # BLD AUTO: 0.8 K/UL (ref 0–1)
MONOCYTES NFR BLD AUTO: 8 %
NEUTROPHILS # BLD AUTO: 7.4 K/UL (ref 1.3–7.7)
NEUTROPHILS NFR BLD AUTO: 77 %
PLATELET # BLD AUTO: 190 K/UL (ref 150–450)
WBC # BLD AUTO: 9.6 K/UL (ref 3.8–10.6)

## 2022-10-28 RX ADMIN — GABAPENTIN SCH MG: 300 CAPSULE ORAL at 16:38

## 2022-10-28 RX ADMIN — IBUPROFEN PRN MG: 800 TABLET, FILM COATED ORAL at 09:09

## 2022-10-28 RX ADMIN — DOCUSATE SODIUM AND SENNOSIDES SCH: 50; 8.6 TABLET ORAL at 19:05

## 2022-10-28 RX ADMIN — HEPARIN SODIUM SCH UNIT: 5000 INJECTION INTRAVENOUS; SUBCUTANEOUS at 09:14

## 2022-10-28 RX ADMIN — GABAPENTIN SCH MG: 300 CAPSULE ORAL at 09:09

## 2022-10-28 RX ADMIN — POTASSIUM CHLORIDE SCH: 14.9 INJECTION, SOLUTION INTRAVENOUS at 05:26

## 2022-10-28 RX ADMIN — IBUPROFEN PRN MG: 800 TABLET, FILM COATED ORAL at 16:36

## 2022-10-28 NOTE — P.PN
Subjective


Progress Note Date: 10/28/22


Principal diagnosis: 


Left knee osteoarthritis





Patient was seen at bedside this morning sitting up in chair with legs elevated.

 Patient says he is in moderate amount of pain currently.  Patient says his pain

was under better control yesterday afternoon/evening and he was even able to 

sleep a bit last night.  Patient is wondering when he will be able to have 

gabapentin and Motrin.  Patient has not worked with physical therapy yet this 

morning.  Patient says he does have a girlfriend and family at home that can 

help him out.  Patient says he does have a walker for home.  Patient says he has

urinated since surgery.  Patient says he has not had bowel movement yet, 

however, patient says he has been passing gas.  Patient denies chest pain, 

fever, shortness breath, nausea, vomiting, change in vision, loss of 

bowel/bladder control.








Objective





- Vital Signs


Vital signs: 


                                   Vital Signs











Temp  98.3 F   10/28/22 07:41


 


Pulse  94   10/28/22 07:41


 


Resp  16   10/28/22 07:41


 


BP  165/89   10/28/22 07:41


 


Pulse Ox  96   10/28/22 07:41


 


FiO2      








                                 Intake & Output











 10/27/22 10/28/22 10/28/22





 18:59 06:59 18:59


 


Output Total 400 653 600


 


Balance -400 -653 -600


 


Output:   


 


  Urine 400 653 600


 


Other:   


 


  Voiding Method Urinal  














- Exam


Left knee:


Incision is clean, dry, and intact.  The silver foam dressing tape is in good 

condition.  There is minimal soft tissue swelling and ecchymosis surrounding the

medial and lateral aspects of the incision.  Calf is soft, no tenderness with 

palpation.  Plantar flexion, dorsiflexion, EHL, FHL are intact.  Sensory exam to

light touch throughout the extremity is intact, dorsal pedis pulses 2+.








Assessment and Plan


Assessment: 


1.  Left knee osteoarthritis





- Postoperative day #2 status post left total knee arthroplasty





Plan: 


1.  Left knee osteoarthritis - left total knee arthroplasty performed Wednesday,

10/26/2022.  Patient currently in a moderate amount of pain at bedside this 

morning.  Patient has yet to work with physical therapy this morning.  Patient's

pain has been difficult to manage.  Patient currently taking Motrin 800 mg TID; 

gabapentin 300 mg - 3 times a day and oxycodone 5 mg every 6 hours.  Patient 

does have good support system at home.  Patient does have walker for home.  We 

will see how patient does with physical therapy this morning.  Anticipate 

discharge home with health services later today.





2.  Appreciate medical management





3.  Pain management - oxycodone 5 mg every 6 hours scheduled; gabapentin 300 mg 

TID scheduled; ibuprofen 800 mg





4.  DVT prophylaxis - Lovenox





5.  GI prophylaxis - senna





6.  PT/OT - weightbearing as tolerated with walker





7.  Encourage incentive spirometer use





8.  Discharge planning - anticipate discharge home with health services later 

today.





Time with Patient: Less than 30

## 2022-10-28 NOTE — P.DS
Providers


Date of admission: 


10/27/22 00:51





Expected date of discharge: 10/28/22


Attending physician: 


Juanito Bates





Primary care physician: 


Adal Odonnell





[facility] Course: 


Date of admission: 10/26/2022


Date of discharge: 10/28/2022





Admission diagnosis: Left knee osteoarthritis


Discharge diagnosis: Same





Attending physician: Dr. Bates





Surgical procedures: Left total knee arthroplasty





Brief history: Patient is a.  52 year-old male with a history of progressive 

primary left knee osteoarthritis.  At this point patient has failed conservative

treatment measures and has opted to proceed with a elective left total knee 

arthroplasty.





Hospital course: Details of patient's surgery can be found in operative report. 

Patient tolerated the procedure well and was subsequently transported to 

orthopedic floor.  Patient's orthopeidc and medical care was provided daily. 

Patient had daily laboratory tests performed for evaluation of overall blood 

counts.  Patient had daily physical therapy to include strengthening range of 

motion as well as education with walker ambulation. Patient was treated with 

Lovenox for their postoperative DVT prophylaxis during their inpatient stay.  

Patient was noted to have a relatively uneventful postoperative course.  Patient

reported satisfactory pain control with oral pain medications by postoperative 

day 2.  Patient showed satisfactory progress with physical therapy.  Patient 

moved steadily through the program and had no difficulty meeting the goals by 

postoperative day 2.  Given patient's otherwise satisfactory course and having 

met physical therapy goals, plan is to discharge patient home with health 

services on postoperative day 2.





Discharge condition/disposition: Patient will be discharged home with health 

services in stable condition.





Discharge medications: Instructions are given on resumption of patient's normal 

daily medications per primary care recommendation, in addition patient will be 

prescribed oxycodone 5 mg; gabapentin 300 mg 3 times a day; Motrin 800 mg; 

aspirin 81 mg twice a day 30 days; Colace; MiraLAX.





Discharge instructions:


1.  Wound care and infection precautions, keep incision dry and covered while 

showering, no lotions, creams, moisturizers. No soaking, tubs, pools, hottubs. 

Do not scrub over the incision.


2.  Weight-bear as tolerated with walker / cane until follow-up.


3.  Ice and elevate when necessary.  Do not exceed 20 minutes per hour with ice 

pack.


4.  Utilize compression sleeve until seen at first follow up appointment.


5.  Visiting nursing care.


6.  Home physical therapy including home CPM.


7.  Pain meds and anticoagulants per prescription.


8.  Pain medication has potential to cause constipation. Increase oral fluid and

fiber intake. Contact primary care provider if you have not had a bowel movement

within 48 hours after discharge


9.  No anti-inflammatory medication until discussed at first post operative 

visit, this including Motrin, Aleve, Mobic, Diclofenac, Aspirin.


10.  Follow up in office at 2 weeks postop with Florentin Mcconnell PA-C / Michael Prather PA-C


11. Follow up with your primary care doctor 7-10 days after discharge.


12. Contact [facility] with any questions, [phone].





Keep incision clean, dry, intact.  All showering, cover silver foam dressing 

with Saran wrap.  Silver foam dressing may removed in 5 days, Wednesday, 

11/02/2022.





Medications:  oxycodone 5 mg; gabapentin 300 mg 3 times a day; Motrin 800 mg; 

aspirin 81 mg twice a day 30 days; Colace; MiraLAX.











Assessment: 


Left knee osteoarthritis


Procedures: 


Left total knee arthroplasty





Patient Condition at Discharge: Good





Plan - Discharge Summary


Discharge Rx Participant: Yes


New Discharge Prescriptions: 


New


   Aspirin [Adult Low Dose Aspirin EC] 81 mg PO BID #60 tab


   Docusate [Colace] 100 mg PO DAILY #30 capsule


   Gabapentin 300 mg PO TID #24 cap


   oxyCODONE HCL 5 mg PO Q6H #28 cap


   Ibuprofen 800 mg PO Q8H #32 tab


   polyethylene glycoL 3350 [Miralax] 17 gm PO DAILY #5 packet





No Action


   Ibuprofen 400 - 800 mg PO AS DIRECTED PRN


     PRN Reason: Pain


   Albuterol Inhaler [Ventolin Hfa Inhaler] 1 - 2 puff INHALATION Q6H PRN


     PRN Reason: Shortness Of Breath


Discharge Medication List





Albuterol Inhaler [Ventolin Hfa Inhaler] 1 - 2 puff INHALATION Q6H PRN 10/24/22 

[History]


Ibuprofen 400 - 800 mg PO AS DIRECTED PRN 10/24/22 [History]


Aspirin [Adult Low Dose Aspirin EC] 81 mg PO BID #60 tab 10/28/22 [Rx]


Docusate [Colace] 100 mg PO DAILY #30 capsule 10/28/22 [Rx]


Gabapentin 300 mg PO TID #24 cap 10/28/22 [Rx]


Ibuprofen 800 mg PO Q8H #32 tab 10/28/22 [Rx]


oxyCODONE HCL 5 mg PO Q6H #28 cap 10/28/22 [Rx]


polyethylene glycoL 3350 [Miralax] 17 gm PO DAILY #5 packet 10/28/22 [Rx]








Follow up Appointment(s)/Referral(s): 


[facility], [NON-STAFF] - As Needed


[facility] [NON-STAFF] - As Needed (Continuous [employer] knee 

machine will be delivered to your home. Please call once you are discharged to 

arrange delivery. )


Abdulaziz Mcconnell PAC [PHYSICIAN ASSISTANT] - 11/15/22 8:20 am


Patient Instructions/Handouts:  Knee Replacement (DC)


Activity/Diet/Wound Care/Special Instructions: 


Discharge instructions:


1.  Wound care and infection precautions, keep incision dry and covered while 

showering, no lotions, creams, moisturizers. No soaking, tubs, pools, hottubs. 

Do not scrub over the incision.


2.  Weight-bear as tolerated with walker / cane until follow-up.


3.  Ice and elevate when necessary.  Do not exceed 20 minutes per hour with ice 

pack.


4.  Utilize compression sleeve until seen at first follow up appointment.


5.  Visiting nursing care.


6.  Home physical therapy including home CPM.


7.  Pain meds and anticoagulants per prescription.


8.  Pain medication has potential to cause constipation. Increase oral fluid and

fiber intake. Contact primary care provider if you have not had a bowel movement

within 48 hours after discharge


9.  No anti-inflammatory medication until discussed at first post operative 

visit, this including Motrin, Aleve, Mobic, Diclofenac, Aspirin.


10.  Follow up in office at 2 weeks postop with Florentin Mcconnell PA-C / Michael Prather PA-C


11. Follow up with your primary care doctor 7-10 days after discharge.


12. Contact [facility] with any questions, [phone].





Keep incision clean, dry, intact.  All showering, cover silver foam dressing 

with Saran wrap.  Silver foam dressing may removed in 5 days, Wednesday, 

11/02/2022.





Medications:  oxycodone 5 mg; gabapentin 300 mg 3 times a day; Motrin 800 mg; 

aspirin 81 mg twice a day 30 days; Colace; MiraLAX.


Discharge Disposition: HOME WITH HOME HEALTH SERVICES

## 2023-04-03 ENCOUNTER — HOSPITAL ENCOUNTER (OUTPATIENT)
Dept: HOSPITAL 47 - LABPAT | Age: 53
Discharge: HOME | End: 2023-04-03
Attending: ORTHOPAEDIC SURGERY
Payer: COMMERCIAL

## 2023-04-03 DIAGNOSIS — Z22.322: ICD-10-CM

## 2023-04-03 DIAGNOSIS — M17.11: ICD-10-CM

## 2023-04-03 DIAGNOSIS — Z01.812: Primary | ICD-10-CM

## 2023-04-03 PROCEDURE — 87070 CULTURE OTHR SPECIMN AEROBIC: CPT

## 2023-04-03 PROCEDURE — 85025 COMPLETE CBC W/AUTO DIFF WBC: CPT

## 2023-04-03 PROCEDURE — 80048 BASIC METABOLIC PNL TOTAL CA: CPT

## 2023-04-04 LAB
ANION GAP SERPL CALC-SCNC: 11.9 MMOL/L (ref 10–18)
BASOPHILS # BLD AUTO: 0.06 X 10*3/UL (ref 0–0.1)
BASOPHILS NFR BLD AUTO: 0.7 %
BUN SERPL-SCNC: 13.2 MG/DL (ref 9–27)
BUN/CREAT SERPL: 13.2 RATIO (ref 12–20)
CALCIUM SPEC-MCNC: 9.7 MG/DL (ref 8.7–10.3)
CHLORIDE SERPL-SCNC: 102 MMOL/L (ref 96–109)
CO2 SERPL-SCNC: 25.1 MMOL/L (ref 20–27.5)
EOSINOPHIL # BLD AUTO: 0.12 X 10*3/UL (ref 0.04–0.35)
EOSINOPHIL NFR BLD AUTO: 1.4 %
ERYTHROCYTE [DISTWIDTH] IN BLOOD BY AUTOMATED COUNT: 5.02 X 10*6/UL (ref 4.4–5.6)
ERYTHROCYTE [DISTWIDTH] IN BLOOD: 13.7 % (ref 11.5–14.5)
GLUCOSE SERPL-MCNC: 91 MG/DL (ref 70–110)
HCT VFR BLD AUTO: 40.8 % (ref 39.6–50)
HGB BLD-MCNC: 12.5 G/DL (ref 13–17)
IMM GRANULOCYTES BLD QL AUTO: 0.2 %
LYMPHOCYTES # SPEC AUTO: 1.54 X 10*3/UL (ref 0.9–5)
LYMPHOCYTES NFR SPEC AUTO: 17.5 %
MCH RBC QN AUTO: 24.9 PG (ref 27–32)
MCHC RBC AUTO-ENTMCNC: 30.6 G/DL (ref 32–37)
MCV RBC AUTO: 81.3 FL (ref 80–97)
MONOCYTES # BLD AUTO: 0.65 X 10*3/UL (ref 0.2–1)
MONOCYTES NFR BLD AUTO: 7.4 %
NEUTROPHILS # BLD AUTO: 6.4 X 10*3/UL (ref 1.8–7.7)
NEUTROPHILS NFR BLD AUTO: 72.8 %
NRBC BLD AUTO-RTO: 0 /100 WBCS (ref 0–0)
PLATELET # BLD AUTO: 281 X 10*3/UL (ref 140–440)
POTASSIUM SERPL-SCNC: 4.2 MMOL/L (ref 3.5–5.5)
SODIUM SERPL-SCNC: 139 MMOL/L (ref 135–145)
WBC # BLD AUTO: 8.79 X 10*3/UL (ref 4.5–10)

## 2023-04-13 ENCOUNTER — HOSPITAL ENCOUNTER (OUTPATIENT)
Dept: HOSPITAL 47 - RADCTMAIN | Age: 53
Discharge: HOME | End: 2023-04-13
Attending: FAMILY MEDICINE
Payer: COMMERCIAL

## 2023-04-13 DIAGNOSIS — I71.21: Primary | ICD-10-CM

## 2023-04-13 PROCEDURE — 71275 CT ANGIOGRAPHY CHEST: CPT

## 2023-04-13 NOTE — CT
EXAMINATION TYPE: CT angio chest

 

DATE OF EXAM: 4/13/2023

 

COMPARISON: 11/16/2017

 

HISTORY: f/u aneurysm

 

CT DLP: 1347.8 mGycm

 

CONTRAST: 

CTA thoracic aorta with 3-D reconstruction is performed and with IV Contrast, patient injected with 1
00 mL of Isovue 300.

 

Contrast CTA of the thoracic aorta was performed from the lung apex through the upper abdomen.  3D re
construction imaging obtained at a separate workstation.  

 

CT Chest:

 

THORACIC AORTA: Again noted is ascending thoracic aortic aneurysm currently measuring 4.3 cm AP dimen
apryl versus 4.5 cm previously. The aortic arch and descending thoracic aorta are of normal caliber. M
ild atheromatous changes seen.  There is no evidence for dissection or periaortic collection.  

 

LUNGS: The lungs are clear and free of infiltrate or atelectasis.  No pulmonary nodule or mass is det
ected.  No pleural effusion or CT evidence of interstitial lung disease.

 

MEDIASTINUM:   No evidence for mediastinal hematoma.  The heart is not enlarged.  No evidence for med
iastinal mass or adenopathy.

 

HILAR STRUCTURES: No evidence for mass.  No hilar adenopathy is appreciated.

 

OTHER: No significant abnormality. 

 

IMPRESSION-

Ascending thoracic aortic aneurysm as noted above.

## 2023-04-24 ENCOUNTER — HOSPITAL ENCOUNTER (OUTPATIENT)
Dept: HOSPITAL 47 - OR | Age: 53
Setting detail: OBSERVATION
LOS: 1 days | Discharge: HOME HEALTH | End: 2023-04-25
Attending: ORTHOPAEDIC SURGERY | Admitting: ORTHOPAEDIC SURGERY
Payer: COMMERCIAL

## 2023-04-24 VITALS — BODY MASS INDEX: 36.9 KG/M2

## 2023-04-24 DIAGNOSIS — E66.9: ICD-10-CM

## 2023-04-24 DIAGNOSIS — Z98.890: ICD-10-CM

## 2023-04-24 DIAGNOSIS — I71.20: ICD-10-CM

## 2023-04-24 DIAGNOSIS — D72.829: ICD-10-CM

## 2023-04-24 DIAGNOSIS — M17.11: Primary | ICD-10-CM

## 2023-04-24 DIAGNOSIS — Z87.891: ICD-10-CM

## 2023-04-24 DIAGNOSIS — Z79.85: ICD-10-CM

## 2023-04-24 DIAGNOSIS — F11.20: ICD-10-CM

## 2023-04-24 DIAGNOSIS — F90.9: ICD-10-CM

## 2023-04-24 DIAGNOSIS — Z79.51: ICD-10-CM

## 2023-04-24 DIAGNOSIS — Z82.49: ICD-10-CM

## 2023-04-24 DIAGNOSIS — Z96.652: ICD-10-CM

## 2023-04-24 DIAGNOSIS — I10: ICD-10-CM

## 2023-04-24 DIAGNOSIS — Z79.899: ICD-10-CM

## 2023-04-24 DIAGNOSIS — J45.909: ICD-10-CM

## 2023-04-24 DIAGNOSIS — Z83.3: ICD-10-CM

## 2023-04-24 DIAGNOSIS — I83.90: ICD-10-CM

## 2023-04-24 DIAGNOSIS — Z83.49: ICD-10-CM

## 2023-04-24 DIAGNOSIS — Z96.691: ICD-10-CM

## 2023-04-24 DIAGNOSIS — D50.9: ICD-10-CM

## 2023-04-24 LAB — GLUCOSE BLD-MCNC: 101 MG/DL (ref 70–110)

## 2023-04-24 PROCEDURE — 64448 NJX AA&/STRD FEM NRV NFS IMG: CPT

## 2023-04-24 PROCEDURE — 97161 PT EVAL LOW COMPLEX 20 MIN: CPT

## 2023-04-24 PROCEDURE — 76942 ECHO GUIDE FOR BIOPSY: CPT

## 2023-04-24 PROCEDURE — 83735 ASSAY OF MAGNESIUM: CPT

## 2023-04-24 PROCEDURE — 80048 BASIC METABOLIC PNL TOTAL CA: CPT

## 2023-04-24 PROCEDURE — 27447 TOTAL KNEE ARTHROPLASTY: CPT

## 2023-04-24 PROCEDURE — 73560 X-RAY EXAM OF KNEE 1 OR 2: CPT

## 2023-04-24 PROCEDURE — 85025 COMPLETE CBC W/AUTO DIFF WBC: CPT

## 2023-04-24 PROCEDURE — 64999 UNLISTED PX NERVOUS SYSTEM: CPT

## 2023-04-24 PROCEDURE — 88300 SURGICAL PATH GROSS: CPT

## 2023-04-24 RX ADMIN — POTASSIUM CHLORIDE SCH: 14.9 INJECTION, SOLUTION INTRAVENOUS at 11:08

## 2023-04-24 RX ADMIN — HYDROCODONE BITARTRATE AND ACETAMINOPHEN PRN EACH: 7.5; 325 TABLET ORAL at 11:05

## 2023-04-24 RX ADMIN — INSULIN ASPART SCH: 100 INJECTION, SOLUTION INTRAVENOUS; SUBCUTANEOUS at 16:50

## 2023-04-24 RX ADMIN — HYDROCODONE BITARTRATE AND ACETAMINOPHEN PRN EACH: 7.5; 325 TABLET ORAL at 17:22

## 2023-04-24 RX ADMIN — ENOXAPARIN SODIUM SCH MG: 30 INJECTION SUBCUTANEOUS at 20:04

## 2023-04-24 RX ADMIN — GABAPENTIN SCH MG: 100 CAPSULE ORAL at 17:22

## 2023-04-24 RX ADMIN — GABAPENTIN SCH MG: 100 CAPSULE ORAL at 12:29

## 2023-04-24 RX ADMIN — POTASSIUM CHLORIDE SCH MLS: 14.9 INJECTION, SOLUTION INTRAVENOUS at 06:30

## 2023-04-24 RX ADMIN — INSULIN ASPART SCH: 100 INJECTION, SOLUTION INTRAVENOUS; SUBCUTANEOUS at 13:58

## 2023-04-24 RX ADMIN — HYDROMORPHONE HYDROCHLORIDE PRN MG: 1 INJECTION, SOLUTION INTRAMUSCULAR; INTRAVENOUS; SUBCUTANEOUS at 15:08

## 2023-04-24 RX ADMIN — GABAPENTIN SCH MG: 100 CAPSULE ORAL at 22:03

## 2023-04-24 RX ADMIN — HYDROMORPHONE HYDROCHLORIDE PRN MG: 1 INJECTION, SOLUTION INTRAMUSCULAR; INTRAVENOUS; SUBCUTANEOUS at 20:04

## 2023-04-24 RX ADMIN — POTASSIUM CHLORIDE SCH MLS/HR: 14.9 INJECTION, SOLUTION INTRAVENOUS at 15:08

## 2023-04-24 NOTE — P.ANPRN
Procedure Note - Anesthesia





- Nerve Block Performed


  ** Right Adductor Canal


Time Out Performed: Yes (06:50)


Date of Procedure: 04/24/23


Procedure Start Time: 06:50


Procedure Stop Time: 06:57


Location of Patient: PreOp


Indication: Acute Post-Operative Pain, Requested by Surgeon (Dr Bates)


Sedation Type: Sedate with meaningful contact maintained


Preparation: Sterile Prep, Sterile Dressing


Position: Supine


Catheter: Indwelling


Needle Types: Pajunk


Needle Gauge: 21


Ultrasound used to visualize needle placement: Yes


Ultrasound used to observe medication spread: Yes


Injectate: 0.5% Ropivacaine (see comment for volume) (20cc)


Blood Aspirated: No


Pain Paresthesia on Injection Noted: No


Resistance on Injection: Normal


Image Stored and Saved: Yes


Events: Uneventful and Well Tolerated

## 2023-04-24 NOTE — P.CONS
History of Present Illness





- Reason for Consult


Consult date: 04/24/23





- Chief Complaint


Medical management





- History of Present Illness





52-year-old man with medical history of opiate dependence now on Suboxone, 

obesity class II, hypertension presented for elective right TKA.  Medicine 

consulted for medical management.  Patient reports having pain control issues of

his right knee.  Says that previously gabapentin has worked well for him in 

addition ibuprofen.  He has no other complaints at this time.  He denies fevers,

chills, nausea, vomiting, chest pain, palpitations, syncopal, presyncopal, 

cough, dyspnea, constipation, diarrhea, dysuria, dyschezia, numbness/weakness of

extremities. 





Upon my evaluation, patient was afebrile, 147/76, heart rate 87, 95% on room 

air.  Point-of-care glucose was 101.  Knee x-ray demonstrates metallic hardware 

from a total right knee arthroplasty, satisfactory alignment.





All Systems reviewed and pertinent positives and negatives noted in HPI, all 

other symptoms are negative





Gen: in no apparent distress, resting comfortably in bed


Eyes: PERRL, no scleral injection or icterus


HENT: normocephalic, atraumatic, good hearing acuity, moist mucous membranes


Neck: no tracheal deviation, full range of motion


Resp: good air exchange, breathing comfortably with no accessory muscle use, no 

tactile fremitus


CVS: good distal perfusion x 4, no pitting edema


GI: soft, NTTP, ND, no hepatosplenomegaly


: no suprapubic tenderness, no CVAT, bojorquez catheter not present


MSK: no clubbing, no cyanosis, no noted contractures of extremities


Skin: no noted rashes, petechiae; temperature of skin is appropriate


Neuro: moving all extremities without signs of weakness, CN II-XII intact


Psych: cooperative, euthymic mood, insight and judgment intact





Labs and imaging as above





Assessment:





Right knee pain


Status post elective TKA


Obesity class II


Opiate dependence on Suboxone


Hypertension








Plan:





Vital signs reviewed and noted in HPI


Lab work reviewed and noted in HPI


CBC, basic metabolic panel, magnesium ordered for tomorrow





Start gabapentin 200 mg 3 times a day


Continue Norco 5/325 as well as 7/325 every 6 when necessary


Start Tylenol 650 mg every 4 when necessary


Dilaudid when necessary for breakthrough pain


Resume patient's home hydrochlorothiazide


Hold patient's home semaglutide


Start insulin sliding scale with aspart





Patient is full code





Past Medical History


Past Medical History: Asthma, Hypertension, Osteoarthritis (OA)


Additional Past Medical History / Comment(s): Varicose veins.


History of Any Multi-Drug Resistant Organisms: None Reported


Past Surgical History: Hernia Repair, Joint Replacement, Orthopedic Surgery


Additional Past Surgical History / Comment(s): Knuckle replacement, deviated 

septum surgery, LT MICAELA 10/26/22,


Past Anesthesia/Blood Transfusion Reactions: No Reported Reaction


Past Psychological History: No Psychological Hx Reported


Smoking Status: Former smoker


Past Alcohol Use History: Rare


Additional Past Alcohol Use History / Comment(s): Quit smoking 10 YEARS AGO


Past Drug Use History: None Reported





- Past Family History


  ** Mother


Family Medical History: No Reported History





Medications and Allergies


                                Home Medications











 Medication  Instructions  Recorded  Confirmed  Type


 


Ibuprofen 800 mg PO Q8H #32 tab 10/28/22 04/24/23 Rx


 


Buprenorphine-Nalox 8-2 mg Tab 1 tab SUBLINGUAL DAILY 04/21/23 04/24/23 History





[Suboxone 8-2 mg Tab]    


 


Linaclotide [Linzess] 145 mcg PO DAILY 04/21/23 04/24/23 History


 


Semaglutide [Ozempic] 2 mg SQ MO 04/21/23 04/24/23 History


 


hydroCHLOROthiazide [Hydrodiuril] 25 mg PO DAILY 04/21/23 04/24/23 History








                                    Allergies











Allergy/AdvReac Type Severity Reaction Status Date / Time


 


No Known Allergies Allergy   Verified 04/24/23 06:16














Physical Exam


Osteopathic Statement: *.  No significant issues noted on an osteopathic 

structural exam other than those noted in the History and Physical/Consult.


Vitals: 


                                   Vital Signs











  Temp Pulse Resp BP Pulse Ox


 


 04/24/23 10:24   87  16  114/61  95


 


 04/24/23 10:13   78  16  109/65  94 L


 


 04/24/23 10:07   72  20  116/63  96


 


 04/24/23 09:52   80  20  115/68  90 L


 


 04/24/23 07:15   69  16  119/60  99


 


 04/24/23 06:15  97.8 F  77  16  135/62  98








                                Intake and Output











 04/23/23 04/24/23 04/24/23





 22:59 06:59 14:59


 


Intake Total  100 1551


 


Output Total   50


 


Balance  100 1501


 


Intake:   


 


  IV  100 1551


 


Output:   


 


  Estimated Blood Loss   50


 


Other:   


 


  Weight  125.5 kg 125.5 kg

## 2023-04-24 NOTE — XR
EXAMINATION TYPE: XR knee limited RT

 

DATE OF EXAM: 4/24/2023

 

CLINICAL HISTORY: Right knee pain and arthritis status post total knee replacement.

 

TECHNIQUE:  Portable AP and crosstable lateral views of the right knee are obtained immediately posto
peratively.

 

COMPARISON: Outside right hip x-ray January 24, 2023

 

FINDINGS:  Metallic hardware from total right knee arthroplasty is seen and appears satisfactory in a
lignment and position.  There is evidence of recent surgery with diffuse subcutaneous gas and soft ti
ssue swelling noted. 

 

 IMPRESSION:  METALLIC HARDWARE FROM TOTAL RIGHT KNEE ARTHROPLASTY IS SATISFACTORY IN ALIGNMENT.

## 2023-04-24 NOTE — P.ANPRN
Procedure Note - Anesthesia





- Nerve Block Performed


  ** Right iPack


Time Out Performed: Yes


Date of Procedure: 04/24/23


Procedure Start Time: 06:58


Procedure Stop Time: 07:03


Location of Patient: PreOp


Indication: Acute Post-Operative Pain, Requested by Surgeon (Dr Bates)


Sedation Type: Sedate with meaningful contact maintained


Preparation: Sterile Prep


Position: Supine


Catheter: None


Needle Types: Pajunk


Needle Gauge: 21


Ultrasound used to visualize needle placement: Yes


Ultrasound used to observe medication spread: Yes


Injectate: 0.5% Ropivacaine (see comment for volume) (15cc +5cc PF Normal 

saline)


Blood Aspirated: No


Pain Paresthesia on Injection Noted: No


Resistance on Injection: Normal


Image Stored and Saved: Yes


Events: Uneventful and Well Tolerated

## 2023-04-24 NOTE — HP
HISTORY AND PHYSICAL



DATE OF SURGERY:

04/24/2023.



HISTORY OF PRESENT ILLNESS:

Kaushik Caro is a 52-year-old gentleman seen with symptomatic right knee osteoarthritis.

After having treatment options discussed, he elected to proceed with right total knee

arthroplasty.  Consent regarding the procedure was obtained.



PAST MEDICAL HISTORY:

Asthma, gout, osteoarthritis.



PAST SURGICAL HISTORY:

Left total knee arthroplasty.



DAILY MEDICATIONS:

Advair inhaler, Motrin, indomethacin, ibuprofen.



ALLERGIES:

None.



SOCIAL HISTORY:

Denies tobacco use.



PHYSICAL EVALUATION OF THE RIGHT KNEE:

Range of motion is negative 2 to 120 degrees.  There is a mild effusion present.

Tenderness along the medial joint line.  Crepitus, medial patellofemoral compartments.

There is pain with patellofemoral compression.  Ligaments are stable.  Hip rotation is

without pain.  His distal neurovascular exam is intact.



RADIOGRAPHS:

Right knee radiographs reveal severe osteoarthritic changes.



IMPRESSION:

1. Right knee osteoarthritis.

2. Asthma.



PLAN:

Right total knee arthroplasty.





MMODL / IJN: [id number] / Job#: [id number]

## 2023-04-24 NOTE — P.OP
Date of Procedure: 04/24/23


Preoperative Diagnosis: 


Right knee osteoarthritis


Postoperative Diagnosis: 


Right knee osteoarthritis


Procedure(s) Performed: 


Right total knee arthroplasty


Implants: 


1.  Depuy attune size 7 right cruciate retaining cemented femur


2.  Depuy attune size 7 fixed-bearing cemented tibial baseplate


3.  Depuy attune size 7 fixed-bearing cruciate retaining 12 mm polyethylene 

tibial insert


4.  Depuy attune 41 mm all polyethylene cemented patella


Anesthesia: regional (Adductor canal catheter, Ipack block), spinal


Surgeon: Juanito Bates


Assistant #1: Abdulaziz Mcconnell


Estimated Blood Loss (ml): 50


Pathology: other (Bone)


Condition: stable


Disposition: PACU


Indications for Procedure: 


52-year-old patient seen with symptomatic right knee osteoarthritis.  After 

treatment options were discussed, he elected to proceed with total knee 

arthroplasty.


Operative Findings: 


See description of procedure


Description of Procedure: 


Patient was taken to the operative suite after having an adductor canal catheter

placed by the department of anesthesia.  Patient underwent a spinal anesthetic 

by the department of anesthesia.  Patient was given preoperative IV intake 

antibiotics and TXA.  A well-padded tourniquet was placed about the right lower 

extremity.  The lower extremity was then prepped and draped in the normal 

sterile orthopedic fashion.  The extremity was elevated, a tourniquet was 

insufflated to 300.  A standard anterior incision was made sharply through skin.

 Dissection was taken down through the subcutaneous soft tissues down to the 

extensor mechanism.  A medial arthrotomy was performed, patella was everted and 

knee was flexed.  There was advanced osteoarthritis noted.  I introduced my 

distal intramedullary femoral drill.  I then introduced the distal femoral cutt

ing jig.  Florentin NASCIMENTO secured the cutting jig with 2 pins.  I held retractors

in position while Florentin NASCIMENTO performed the distal femoral resection through 

the guide area we now removed her distal femoral cutting guide.  We now placed 

our 4-in-1 femoral cutting block and positioned and it was secured with 2 pins 

by Florentin NASCIMENTO while I held the block in position.  The distal femoral 

finishing was now completed.  A proximal tibial cutting guide was positioned.  I

held the guide in the appropriate position with both hands well Florentin NASCIMENTO 

inserted stabilizing pins into the guide.  Proximal tibial cut was made. We now 

placed a trial femoral component into position, along with an appropriate size 

tibial tray and insert.  We now took the knee through range of motion and had 

full extension good flexion and good overall soft tissue balance noted.  The 

patella was everted and stabilized with 2 towel clips held by Florentin NASCIMENTO 

while I performed a flush with patellar quad tendon utilizing a fresh sawblade. 

We templated the patella, appropriate drill holes were made.  An appropriate 

trial patella was positioned, knee was taken through full range of motion with 

the patella tracking very nicely.  The trial patella was removed.  Drill holes 

were made through the femoral component.  All trial components were removed 

after marking off the appropriate rotation of the tibia.  Retractors were now 

positioned along the proximal tibia.  An appropriate keel punch was made with 

the appropriate size tibial guide by myself on Florentin NASCIMENTO assisted by 

holding retractors.  At this point appropriate size implants were chosen and 

opened.  The joint was irrigated copiously with pulse lavage mechanical 

irrigation.  The wound was irrigated with pulse lavage mechanical irrigation.  

We mixed antibiotic methylmethacrylate.  We placed the knee into flexion.  We 

placed multiple retractors assisted by Florentin NASCIMENTO to expose the proximal 

tibia.  Once the methyl methacrylate was ready, the tibial component was 

cemented into place removing any excess methylmethacrylate form by both myself 

and Florentin NASCIMENTO.  The femoral component was cemented into place removing the 

removing any excess methylmethacrylate performed by both myself and Florentin NASCIMENTO.  We then inserted the appropriate size polyethylene tibial insert.  We made 

sure that it was locked into position.  We took the knee into full extension, 

and then back in a flexion making sure we had removed any excess 

methylmethacrylate.  The patellar component was then cemented down and secured 

with clamp.  Excess methylmethacrylate removed.  We kept the knee in full 

extension, patellar clamp in position until methylmethacrylate had hardened.  

Once it had hardened the patellar clamp was removed.  The knee was taken through

full range of motion.  The patella tracked nicely.  There was good soft tissue 

balancing.  The tourniquet was now released.  Additional hemostasis was achieved

via electrocautery.  A second gram of TXA was given.  The wound again was 

irrigated with pulse lavage mechanical irrigation.  The extensor mechanism was 

repaired with Ethibond suture.  We checked the repair with range of motion and 

it was stable.  The subcutaneous soft tissues were repaired with Vicryl in 

layers.  The skin was approximated with pernio/Dermabond.  Sterile dressings 

were applied followed by loose web roll and Ace bandage.  The patient was 

transferred to a bed, and taken to recovery in stable and satisfactory 

condition.  Florentin NASCIMENTO assisted with this complex procedure.

## 2023-04-25 VITALS
SYSTOLIC BLOOD PRESSURE: 146 MMHG | RESPIRATION RATE: 19 BRPM | DIASTOLIC BLOOD PRESSURE: 83 MMHG | HEART RATE: 74 BPM | TEMPERATURE: 98 F

## 2023-04-25 LAB
ANION GAP SERPL CALC-SCNC: 11.4 MMOL/L (ref 10–18)
BASOPHILS # BLD AUTO: 0.06 X 10*3/UL (ref 0–0.1)
BASOPHILS NFR BLD AUTO: 0.5 %
BUN SERPL-SCNC: 11.4 MG/DL (ref 9–27)
BUN/CREAT SERPL: 12.13 RATIO (ref 12–20)
CALCIUM SPEC-MCNC: 9 MG/DL (ref 8.7–10.3)
CHLORIDE SERPL-SCNC: 100 MMOL/L (ref 96–109)
CO2 SERPL-SCNC: 28.6 MMOL/L (ref 20–27.5)
EOSINOPHIL # BLD AUTO: 0.09 X 10*3/UL (ref 0.04–0.35)
EOSINOPHIL NFR BLD AUTO: 0.7 %
ERYTHROCYTE [DISTWIDTH] IN BLOOD BY AUTOMATED COUNT: 4.8 X 10*6/UL (ref 4.4–5.6)
ERYTHROCYTE [DISTWIDTH] IN BLOOD: 13.9 % (ref 11.5–14.5)
GLUCOSE SERPL-MCNC: 99 MG/DL (ref 70–110)
HCT VFR BLD AUTO: 38 % (ref 39.6–50)
HGB BLD-MCNC: 12 G/DL (ref 13–17)
IMM GRANULOCYTES BLD QL AUTO: 0.3 %
LYMPHOCYTES # SPEC AUTO: 1.7 X 10*3/UL (ref 0.9–5)
LYMPHOCYTES NFR SPEC AUTO: 13.7 %
MAGNESIUM SPEC-SCNC: 1.8 MG/DL (ref 1.5–2.4)
MCH RBC QN AUTO: 25 PG (ref 27–32)
MCHC RBC AUTO-ENTMCNC: 31.6 G/DL (ref 32–37)
MCV RBC AUTO: 79.2 FL (ref 80–97)
MONOCYTES # BLD AUTO: 1.29 X 10*3/UL (ref 0.2–1)
MONOCYTES NFR BLD AUTO: 10.4 %
NEUTROPHILS # BLD AUTO: 9.26 X 10*3/UL (ref 1.8–7.7)
NEUTROPHILS NFR BLD AUTO: 74.4 %
NRBC BLD AUTO-RTO: 0 /100 WBCS (ref 0–0)
PLATELET # BLD AUTO: 235 X 10*3/UL (ref 140–440)
POTASSIUM SERPL-SCNC: 3.7 MMOL/L (ref 3.5–5.5)
SODIUM SERPL-SCNC: 140 MMOL/L (ref 135–145)
WBC # BLD AUTO: 12.44 X 10*3/UL (ref 4.5–10)

## 2023-04-25 RX ADMIN — ENOXAPARIN SODIUM SCH MG: 30 INJECTION SUBCUTANEOUS at 07:55

## 2023-04-25 RX ADMIN — GABAPENTIN SCH MG: 100 CAPSULE ORAL at 07:55

## 2023-04-25 RX ADMIN — INSULIN ASPART SCH: 100 INJECTION, SOLUTION INTRAVENOUS; SUBCUTANEOUS at 07:49

## 2023-04-25 RX ADMIN — INSULIN ASPART SCH: 100 INJECTION, SOLUTION INTRAVENOUS; SUBCUTANEOUS at 12:22

## 2023-04-25 RX ADMIN — HYDROCODONE BITARTRATE AND ACETAMINOPHEN PRN EACH: 7.5; 325 TABLET ORAL at 07:55

## 2023-04-25 RX ADMIN — POTASSIUM CHLORIDE SCH: 14.9 INJECTION, SOLUTION INTRAVENOUS at 07:48

## 2023-04-25 RX ADMIN — HYDROCODONE BITARTRATE AND ACETAMINOPHEN PRN EACH: 7.5; 325 TABLET ORAL at 01:46

## 2023-04-25 NOTE — P.PN
Subjective


Progress Note Date: 04/25/23


Principal diagnosis: 


Right knee osteoarthritis





Patient was seen at bedside this morning lying back in the chair with legs 

elevated.  Patient says he is doing much better after having this knee replaced 

the knee was when he had his other knee replaced this past fall.  Patient feels 

that his pain is under good control.  Patient says he has urinated several times

since surgery yesterday.  Patient says he has not had a bowel movement yet, 

however, patient says he has been passing gas.  Patient says he does have a 

walker for home.  Patient says he did work with physical therapy this morning 

and walked up-and-down steps out in the hallway.  Patient says he is looking 

forward to going home later today.  Patient denies chest pain, fever, shortness 

breath, nausea, vomiting, change in vision, loss of bowel/bladder control.








Objective





- Vital Signs


Vital signs: 


                                   Vital Signs











Temp  98.0 F   04/25/23 07:32


 


Pulse  74   04/25/23 07:32


 


Resp  19   04/25/23 07:32


 


BP  146/83   04/25/23 07:32


 


Pulse Ox  99   04/25/23 07:32


 


FiO2      








                                 Intake & Output











 04/24/23 04/25/23 04/25/23





 18:59 06:59 18:59


 


Intake Total 2631  236


 


Output Total 450 1900 


 


Balance 2181 -1900 236


 


Weight 125.5 kg  


 


Intake:   


 


  IV 1551  


 


  Oral 1080  236


 


Output:   


 


  Urine 400 1900 


 


  Estimated Blood Loss 50  


 


Other:   


 


  Voiding Method  Urinal 














- Exam


Right knee:


Incision is clean, dry, and intact.  The silver foam dressing is in good 

condition.  There is minimal soft tissue swelling and ecchymosis surrounding the

medial and lateral aspects of the incision.  Calf is soft, no tenderness with 

palpation.  Plantar flexion, dorsiflexion, EHL, FHL are intact.  Sensory exam to

light touch throughout the extremity is intact, dorsal pedis pulses 2+.








- Labs


CBC & Chem 7: 


                                 04/25/23 07:42








Assessment and Plan


Assessment: 


1.  Right knee osteoarthritis





- Postoperative day 1 status post right total knee arthroplasty





Plan: 


1.  Right knee osteoarthritis - right total knee arthroplasty performed 

yesterday, Monday, 04/24/2023.  Patient stable at bedside this morning.  Patient

does have a walker for home.  Discharge home today with health services.





2.  Appreciate medical management





3.  Pain management - gabapentin; Norco





4.  DVT prophylaxis - Lovenox in hospital.  Going home with aspirin 81 mg twice 

a day 30 days





5.  GI prophylaxis - senna





6.  PT/OT - weightbearing as tolerated with walker





7.  Encourage incentive spirometer use





8.  Discharge planning - discharge home with health services today





Time with Patient: Less than 30

## 2023-04-25 NOTE — P.PN
Subjective


Progress Note Date: 04/25/23





Patient was seen and examined.  No acute events overnight.  Patient reports 

well-controlled pain in his right knee.  He has no other complaints.  Working 

with PT.  Looking forward to going home.





General: non toxic, no distress, appears at stated age


Derm: warm, dry


Head: atraumatic, normocephalic, symmetric


Lungs: no accessory muscle use


Psych: Alert, oriented, appropriate affect 





Leukocytosis


Microcytic anemia


Right knee pain


Status post elective TKA


Obesity class II


Opiate dependence on Suboxone


Hypertension





Leukocytosis likely reactive from surgery.


His hemoglobin appears at baseline.


Plans to resume home medications on discharge.  


He is medically stable for discharge.





Objective





- Vital Signs


Vital signs: 


                                   Vital Signs











Temp  98.0 F   04/25/23 07:32


 


Pulse  74   04/25/23 07:32


 


Resp  19   04/25/23 07:32


 


BP  146/83   04/25/23 07:32


 


Pulse Ox  99   04/25/23 07:32


 


FiO2      








                                 Intake & Output











 04/24/23 04/25/23 04/25/23





 18:59 06:59 18:59


 


Intake Total 2631  236


 


Output Total 450 1900 


 


Balance 2181 -1900 236


 


Weight 125.5 kg  


 


Intake:   


 


  IV 1551  


 


  Oral 1080  236


 


Output:   


 


  Urine 400 1900 


 


  Estimated Blood Loss 50  


 


Other:   


 


  Voiding Method  Urinal 














- Labs


CBC & Chem 7: 


                                 04/25/23 07:42





                                 04/25/23 07:42


Labs: 


                  Abnormal Lab Results - Last 24 Hours (Table)











  04/25/23 04/25/23 Range/Units





  07:42 07:42 


 


WBC  12.44 H   (4.50-10.00)  X 10*3/uL


 


Hgb  12.0 L   (13.0-17.0)  g/dL


 


Hct  38.0 L   (39.6-50.0)  %


 


MCV  79.2 L   (80.0-97.0)  fL


 


MCH  25.0 L   (27.0-32.0)  pg


 


MCHC  31.6 L   (32.0-37.0)  g/dL


 


Neutrophils #  9.26 H   (1.80-7.70)  X 10*3/uL


 


Monocytes #  1.29 H   (0.20-1.00)  X 10*3/uL


 


Carbon Dioxide   28.6 H  (20.0-27.5)  mmol/L

## 2023-04-25 NOTE — P.PN
Progress Note - Text


The patient is status post, right adductor canal catheter placement.  The 

catheter was placed for postoperative pain control, status post total knee 

arthroplasty.  Ropivacaine 0.2% is infusing at 8 mLs per hour.  The patient has 

no complaints  of right lower extremity numbness or weakness.  Patient's VAS 

score is 2-3 -10.   Assessment: Patient's adductor canal catheter is in place 

and working appropriately.  Plan: continue infusion and adjust it as needed.

## 2023-04-25 NOTE — P.DS
Providers


Date of admission: 


04/24/23 09:32





Expected date of discharge: 04/25/23


Attending physician: 


Juanito Bates





Consults: 





                                        





04/24/23 09:31


Consult Physician Routine 


   Consulting Provider: Amairani Mckinney


   Consult Reason/Comments: Medical management


   Do you want consulting provider notified?: Yes











Primary care physician: 


Adal QUINN [facility] Course: 


Date of admission: 04/24/2023


Date of discharge: 04/25/2023





Admission diagnosis: Right knee osteoarthritis


Discharge diagnosis: Same





Attending physician: Dr. Bates





Surgical procedures: Right total knee arthroplasty





Brief history: Patient is a 52-year-old male with a history of progressive 

primary right knee osteoarthritis.  At this point patient has failed 

conservative treatment measures and has opted to proceed with a elective right 

total knee arthroplasty.





Hospital course: Details of patient's surgery can be found in operative report. 

Patient tolerated the procedure well and was subsequently transported to 

orthopedic floor.  Patient's orthopeidc and medical care was provided daily. 

Patient had daily laboratory tests performed for evaluation of overall blood cou

nts.  Patient had daily physical therapy to include strengthening range of 

motion as well as education with walker ambulation. Patient was treated with 

Lovenox for their postoperative DVT prophylaxis during their inpatient stay.  

Patient was noted to have a relatively uneventful postoperative course.  Patient

reported satisfactory pain control with oral pain medications by postoperative 

day 1.  Patient showed satisfactory progress with physical therapy.  Patient 

moved steadily through the program and had no difficulty meeting the goals by 

postoperative day 1.  Given patient's otherwise satisfactory course and having 

met physical therapy goals, plan is to discharge patient home with health 

services on postoperative day 1.





Discharge condition/disposition: Patient will be discharged home with health 

services in stable condition.





Discharge medications: Instructions are given on resumption of patient's normal 

daily medications per primary care recommendation, in addition patient will be p

rescribed Norco 7.5 mg/325 mg 12 every 6 hours; senna; aspirin 81 mg twice a 

day 30 days; gabapentin 300 mg 3 times a day





Discharge instructions:


1.  Wound care and infection precautions, keep incision dry and covered while 

showering, no lotions, creams, moisturizers. No soaking, tubs, pools, hottubs. 

Do not scrub over the incision.


2.  Weight-bear as tolerated with walker / cane until follow-up.


3.  Ice and elevate when necessary.  Do not exceed 20 minutes per hour with ice 

pack.


4.  Utilize compression sleeve until seen at first follow up appointment.


5.  Visiting nursing care.


6.  Home physical therapy including home CPM.


7.  Pain meds and anticoagulants per prescription.


8.  Pain medication has potential to cause constipation. Increase oral fluid and

fiber intake. Contact primary care provider if you have not had a bowel movement

within 48 hours after discharge


9.  No anti-inflammatory medication until discussed at first post operative 

visit, this including Motrin, Aleve, Mobic, Diclofenac


10.  Follow up in office at 2 weeks postop with Florentin Mcconnell PA-C / Michael Prather PA-C


11. Follow up with your primary care doctor 7-10 days after discharge.


12. Contact [facility] with any questions, [phone].





Keep incision clean, dry, intact.  While showering, cover silver foam dressing 

with Saran wrap.  Silver foam dressing may removed in 7 days, Monday, 

05/01/2023.  May shower directly over incision once silver foam dressing is 

removed





Assessment: 


Right knee osteoarthritis


Procedures: 


Right total knee arthroplasty





Patient Condition at Discharge: Good





Plan - Discharge Summary


Discharge Rx Participant: Yes


New Discharge Prescriptions: 


New


   HYDROcodone/APAP 7.5-325MG [Norco 7.5] 1 each PO Q6HR PRN #28 tab


     PRN Reason: Pain


   Sennosides/Docusate Sodium [Senna Plus 8.6-50 mg Softgel] 1 each PO DAILY #20

capsule


   Gabapentin 300 mg PO TID #24 cap


   Aspirin [Adult Low Dose Aspirin EC] 81 mg PO BID #60 tab





Continue


   Ibuprofen 800 mg PO Q8H #32 tab


   hydroCHLOROthiazide [Hydrodiuril] 25 mg PO DAILY


   Semaglutide [Ozempic] 2 mg SQ MO


   Linaclotide [Linzess] 145 mcg PO DAILY





No Action


   Buprenorphine-Nalox 8-2 mg Tab [Suboxone 8-2 mg Tab] 1 tab SUBLINGUAL DAILY


Discharge Medication List





Ibuprofen 800 mg PO Q8H #32 tab 10/28/22 [Rx]


Buprenorphine-Nalox 8-2 mg Tab [Suboxone 8-2 mg Tab] 1 tab SUBLINGUAL DAILY 04/2 1/23 [History]


Linaclotide [Linzess] 145 mcg PO DAILY 04/21/23 [History]


Semaglutide [Ozempic] 2 mg SQ MO 04/21/23 [History]


hydroCHLOROthiazide [Hydrodiuril] 25 mg PO DAILY 04/21/23 [History]


Aspirin [Adult Low Dose Aspirin EC] 81 mg PO BID #60 tab 04/25/23 [Rx]


Gabapentin 300 mg PO TID #24 cap 04/25/23 [Rx]


HYDROcodone/APAP 7.5-325MG [Norco 7.5] 1 each PO Q6HR PRN #28 tab 04/25/23 [Rx]


Sennosides/Docusate Sodium [Senna Plus 8.6-50 mg Softgel] 1 each PO DAILY #20 

capsule 04/25/23 [Rx]








Follow up Appointment(s)/Referral(s): 


[facility], [NON-STAFF] - As Needed


Adal Odonnell MD [Primary Care Provider] - 1 Week


Abdulaziz Mcconnell PAC [PHYSICIAN ASSISTANT] - 05/10/23 3:30 pm


Patient Instructions/Handouts:  Knee Replacement (DC)


Activity/Diet/Wound Care/Special Instructions: 








Discharge instructions:


1.  Wound care and infection precautions, keep incision dry and covered while 

showering, no lotions, creams, moisturizers. No soaking, tubs, pools, hottubs. 

Do not scrub over the incision.


2.  Weight-bear as tolerated with walker / cane until follow-up.


3.  Ice and elevate when necessary.  Do not exceed 20 minutes per hour with ice 

pack.


4.  Utilize compression sleeve until seen at first follow up appointment.


5.  Visiting nursing care.


6.  Home physical therapy including home CPM.


7.  Pain meds and anticoagulants per prescription.


8.  Pain medication has potential to cause constipation. Increase oral fluid and

fiber intake. Contact primary care provider if you have not had a bowel movement

within 48 hours after discharge


9.  No anti-inflammatory medication until discussed at first post operative 

visit, this including Motrin, Aleve, Mobic, Diclofenac


10.  Follow up in office at 2 weeks postop with Florentin Mcconnell PA-C / Michael Prather PA-C


11. Follow up with your primary care doctor 7-10 days after discharge.


12. Contact [facility] with any questions, [phone].





Keep incision clean, dry, intact.  While showering, cover silver foam dressing 

with Saran wrap.  Silver foam dressing may removed in 7 days, Monday, 

05/01/2023.  May shower directly over incision once silver foam dressing is 

removed


Discharge Disposition: HOME WITH HOME HEALTH SERVICES

## 2023-06-12 ENCOUNTER — HOSPITAL ENCOUNTER (OUTPATIENT)
Dept: HOSPITAL 47 - LABWHC1 | Age: 53
Discharge: HOME | End: 2023-06-12
Attending: NURSE PRACTITIONER
Payer: COMMERCIAL

## 2023-06-12 DIAGNOSIS — I10: Primary | ICD-10-CM

## 2023-06-12 DIAGNOSIS — E23.0: ICD-10-CM

## 2023-06-12 LAB
ALBUMIN SERPL-MCNC: 4.5 D/DL (ref 3.8–4.9)
ALBUMIN/GLOB SERPL: 1.41 RATIO (ref 1.6–3.17)
ALP SERPL-CCNC: 146 U/L (ref 41–126)
ALT SERPL-CCNC: 28 U/L (ref 10–49)
ANION GAP SERPL CALC-SCNC: 11.5 MMOL/L (ref 4–12)
AST SERPL-CCNC: 18 U/L (ref 14–35)
BUN SERPL-SCNC: 13.1 MG/DL (ref 9–27)
BUN/CREAT SERPL: 13.1 RATIO (ref 12–20)
CALCIUM SPEC-MCNC: 10.5 MG/DL (ref 8.7–10.3)
CHLORIDE SERPL-SCNC: 99 MMOL/L (ref 96–109)
CO2 SERPL-SCNC: 29.5 MMOL/L (ref 21.6–31.8)
GLOBULIN SER CALC-MCNC: 3.2 D/DL (ref 1.6–3.3)
GLUCOSE SERPL-MCNC: 93 MG/DL (ref 70–110)
POTASSIUM SERPL-SCNC: 4.8 MMOL/L (ref 3.5–5.5)
PROT SERPL-MCNC: 7.7 D/DL (ref 6.2–8.2)
SODIUM SERPL-SCNC: 140 MMOL/L (ref 135–145)

## 2023-06-12 PROCEDURE — 82040 ASSAY OF SERUM ALBUMIN: CPT

## 2023-06-12 PROCEDURE — 84270 ASSAY OF SEX HORMONE GLOBUL: CPT

## 2023-06-12 PROCEDURE — 82672 ASSAY OF ESTROGEN: CPT

## 2023-06-12 PROCEDURE — 84403 ASSAY OF TOTAL TESTOSTERONE: CPT

## 2023-06-12 PROCEDURE — 80053 COMPREHEN METABOLIC PANEL: CPT

## 2023-06-12 PROCEDURE — 36415 COLL VENOUS BLD VENIPUNCTURE: CPT

## 2023-06-23 ENCOUNTER — HOSPITAL ENCOUNTER (OUTPATIENT)
Dept: HOSPITAL 47 - RADCTMAIN | Age: 53
Discharge: HOME | End: 2023-06-23
Attending: NURSE PRACTITIONER
Payer: COMMERCIAL

## 2023-06-23 ENCOUNTER — HOSPITAL ENCOUNTER (INPATIENT)
Dept: HOSPITAL 47 - EC | Age: 53
LOS: 3 days | Discharge: HOME | DRG: 373 | End: 2023-06-26
Attending: SURGERY | Admitting: SURGERY
Payer: COMMERCIAL

## 2023-06-23 DIAGNOSIS — J45.909: ICD-10-CM

## 2023-06-23 DIAGNOSIS — I83.90: ICD-10-CM

## 2023-06-23 DIAGNOSIS — Z87.891: ICD-10-CM

## 2023-06-23 DIAGNOSIS — K35.33: Primary | ICD-10-CM

## 2023-06-23 DIAGNOSIS — Z79.899: ICD-10-CM

## 2023-06-23 DIAGNOSIS — Z96.698: ICD-10-CM

## 2023-06-23 DIAGNOSIS — Z79.82: ICD-10-CM

## 2023-06-23 DIAGNOSIS — Z79.85: ICD-10-CM

## 2023-06-23 DIAGNOSIS — K35.32: Primary | ICD-10-CM

## 2023-06-23 DIAGNOSIS — I10: ICD-10-CM

## 2023-06-23 DIAGNOSIS — M19.90: ICD-10-CM

## 2023-06-23 LAB
ALBUMIN SERPL-MCNC: 4.1 G/DL (ref 3.5–5)
ALP SERPL-CCNC: 124 U/L (ref 38–126)
ALT SERPL-CCNC: 25 U/L (ref 4–49)
ANION GAP SERPL CALC-SCNC: 10 MMOL/L
APTT BLD: 25.1 SEC (ref 22–30)
AST SERPL-CCNC: 26 U/L (ref 17–59)
BASOPHILS # BLD AUTO: 0 K/UL (ref 0–0.2)
BASOPHILS NFR BLD AUTO: 0 %
BUN SERPL-SCNC: 14 MG/DL (ref 9–20)
CALCIUM SPEC-MCNC: 9.6 MG/DL (ref 8.4–10.2)
CHLORIDE SERPL-SCNC: 95 MMOL/L (ref 98–107)
CO2 SERPL-SCNC: 30 MMOL/L (ref 22–30)
EOSINOPHIL # BLD AUTO: 0.1 K/UL (ref 0–0.7)
EOSINOPHIL NFR BLD AUTO: 1 %
ERYTHROCYTE [DISTWIDTH] IN BLOOD BY AUTOMATED COUNT: 4.54 M/UL (ref 4.3–5.9)
ERYTHROCYTE [DISTWIDTH] IN BLOOD: 15 % (ref 11.5–15.5)
GLUCOSE SERPL-MCNC: 96 MG/DL (ref 74–99)
HCT VFR BLD AUTO: 36.8 % (ref 39–53)
HGB BLD-MCNC: 12 GM/DL (ref 13–17.5)
INR PPP: 0.9 (ref ?–1.2)
LYMPHOCYTES # SPEC AUTO: 1.4 K/UL (ref 1–4.8)
LYMPHOCYTES NFR SPEC AUTO: 12 %
MCH RBC QN AUTO: 26.4 PG (ref 25–35)
MCHC RBC AUTO-ENTMCNC: 32.5 G/DL (ref 31–37)
MCV RBC AUTO: 81.1 FL (ref 80–100)
MONOCYTES # BLD AUTO: 0.6 K/UL (ref 0–1)
MONOCYTES NFR BLD AUTO: 5 %
NEUTROPHILS # BLD AUTO: 9.5 K/UL (ref 1.3–7.7)
NEUTROPHILS NFR BLD AUTO: 81 %
PLATELET # BLD AUTO: 366 K/UL (ref 150–450)
POTASSIUM SERPL-SCNC: 3.5 MMOL/L (ref 3.5–5.1)
PROT SERPL-MCNC: 7.5 G/DL (ref 6.3–8.2)
PT BLD: 9.8 SEC (ref 9–12)
SODIUM SERPL-SCNC: 135 MMOL/L (ref 137–145)
WBC # BLD AUTO: 11.8 K/UL (ref 3.8–10.6)

## 2023-06-23 PROCEDURE — 96365 THER/PROPH/DIAG IV INF INIT: CPT

## 2023-06-23 PROCEDURE — 87040 BLOOD CULTURE FOR BACTERIA: CPT

## 2023-06-23 PROCEDURE — 96375 TX/PRO/DX INJ NEW DRUG ADDON: CPT

## 2023-06-23 PROCEDURE — 85610 PROTHROMBIN TIME: CPT

## 2023-06-23 PROCEDURE — 96366 THER/PROPH/DIAG IV INF ADDON: CPT

## 2023-06-23 PROCEDURE — 85730 THROMBOPLASTIN TIME PARTIAL: CPT

## 2023-06-23 PROCEDURE — 83605 ASSAY OF LACTIC ACID: CPT

## 2023-06-23 PROCEDURE — 99285 EMERGENCY DEPT VISIT HI MDM: CPT

## 2023-06-23 PROCEDURE — 80053 COMPREHEN METABOLIC PANEL: CPT

## 2023-06-23 PROCEDURE — 74176 CT ABD & PELVIS W/O CONTRAST: CPT

## 2023-06-23 PROCEDURE — 85025 COMPLETE CBC W/AUTO DIFF WBC: CPT

## 2023-06-23 PROCEDURE — 86140 C-REACTIVE PROTEIN: CPT

## 2023-06-23 RX ADMIN — PIPERACILLIN AND TAZOBACTAM SCH MLS/HR: 3; .375 INJECTION, POWDER, FOR SOLUTION INTRAVENOUS at 21:40

## 2023-06-23 RX ADMIN — CEFAZOLIN SCH MLS/HR: 330 INJECTION, POWDER, FOR SOLUTION INTRAMUSCULAR; INTRAVENOUS at 21:41

## 2023-06-23 RX ADMIN — CEFAZOLIN SCH: 330 INJECTION, POWDER, FOR SOLUTION INTRAMUSCULAR; INTRAVENOUS at 14:04

## 2023-06-23 RX ADMIN — HEPARIN SODIUM SCH UNIT: 5000 INJECTION INTRAVENOUS; SUBCUTANEOUS at 21:40

## 2023-06-23 RX ADMIN — PANTOPRAZOLE SODIUM SCH MG: 40 INJECTION, POWDER, FOR SOLUTION INTRAVENOUS at 14:50

## 2023-06-23 NOTE — CT
EXAMINATION TYPE: CT abdomen pelvis wo con

 

DATE OF EXAM: 6/23/2023

 

COMPARISON: None

 

HISTORY: 52-year-old male RLQ pain, R/o appendicitis

 

CT DLP: 1191.20 mGycm. Automated exposure control for dose reduction was used.

 

TECHNIQUE: Contiguous axial scanning of the abdomen and pelvis without IV contrast. Coronal and sagit
izaiah reconstructions performed. 

 

FINDINGS: 

LUNG BASES: Strandy atelectasis but otherwise otherwise significant abnormality is appreciated.

 

LIVER/GB: Borderline enlarged measuring 17.7 cm. Abnormal gallbladder distention.

 

PANCREAS: No significant abnormality is seen.

 

SPLEEN: No significant abnormality is seen.

 

ADRENALS: No significant abnormality is seen.

 

KIDNEYS: No significant abnormality is seen.

 

BOWEL: Inflamed, thickened appendix but with severe periappendiceal fat stranding. There is evidence 
of perforation would poorly defined mottled air and inflammatory soft tissue in the right lower quadr
ant just adjacent to the appendix measuring 3.7 x 3.7 cm.

 

No large or free intraperitoneal air or additional free fluid is seen. No dilated small bowel. Mild o
verall stool burden.

 

LYMPH NODES:  No significant abnormality is seen.

 

OTHER: No significant abnormality is seen.

 

PELVIS: Bladder nondistended. No abnormal fluid collection in the pelvis or pelvic lymphadenopathy.

 

BONES: Scattered moderate spondylotic change. Degenerative grade 1 retrolisthesis L1-L2, L2-L3, L3-L4
. Degenerative grade 1 anterolisthesis L4-L5.

 

 

IMPRESSION: 

Exam positive for acute appendicitis complicated by perforation and a 3.7 cm area of phlegmon/contain
ed leak

## 2023-06-23 NOTE — P.CONS
History of Present Illness





- Reason for Consult


Consult date: 06/23/23





- History of Present Illness


Patient is a 52-year-old  male with a past medical history significant 

for asthma and osteoarthritis has been sent to the hospital after the patient 

was noticed to have abnormal CT abdominal pelvis with  evidence of acute 

appendicitis complicated by perforation and 3.7 cm area of phlegmon contained 

leak patient mention started having abdominal pain which was mostly to the 

center and right lower quadrant area started around Monday and has progressively

got worse patient describes the pain to be more of a sharp in nature almost 9-10

out of 10 in severity with no radiation patient felt nauseated but no vomiting 

denies have any diarrhea or constipation patient on presentation the hospital 

was afebrile and no fever has been recorded subsequently patient was noticed to 

have white count of 11.8 with a left shift.  Currently has been normal limits of

the normal patient was started on Zosyn infectious disease was consulted for 

further management of antibiotic therapy








Past Medical History


Past Medical History: Asthma, Osteoarthritis (OA)


Additional Past Medical History / Comment(s): Varicose veins.


History of Any Multi-Drug Resistant Organisms: None Reported


Past Surgical History: Hernia Repair, Orthopedic Surgery


Additional Past Surgical History / Comment(s): Knuckle replacement, deviated 

septum surgery, tlk 10/26/22


Past Anesthesia/Blood Transfusion Reactions: No Reported Reaction


Past Psychological History: No Psychological Hx Reported


Smoking Status: Former smoker


Past Alcohol Use History: Rare


Past Drug Use History: None Reported





- Past Family History


  ** Mother


Family Medical History: No Reported History





Medications and Allergies


                                Home Medications











 Medication  Instructions  Recorded  Confirmed  Type


 


Linaclotide [Linzess] 145 mcg PO DAILY 04/21/23 06/23/23 History


 


Semaglutide [Ozempic] 2 mg SQ MO 04/21/23 06/23/23 History


 


Lisinopril-Hctz 20-25 mg 1 tab PO DAILY 06/23/23 06/23/23 History





[Zestoretic 20-25]    








                                    Allergies











Allergy/AdvReac Type Severity Reaction Status Date / Time


 


No Known Allergies Allergy   Verified 06/23/23 14:37














Physical Exam


Vitals: 


                                   Vital Signs











  Temp Pulse Resp BP Pulse Ox


 


 06/23/23 16:18   70  19  103/50  97


 


 06/23/23 14:00   79  18  101/83  97


 


 06/23/23 13:35  98.3 F  79  18  107/73  98








                                Intake and Output











 06/23/23 06/23/23 06/23/23





 06:59 14:59 22:59


 


Other:   


 


  Weight  120.202 kg 














Results


CBC & Chem 7: 


                                 06/24/23 11:41





                                 06/23/23 14:00


Labs: 


                  Abnormal Lab Results - Last 24 Hours (Table)











  06/23/23 06/23/23 Range/Units





  14:00 14:00 


 


WBC  11.8 H   (3.8-10.6)  k/uL


 


Hgb  12.0 L   (13.0-17.5)  gm/dL


 


Hct  36.8 L   (39.0-53.0)  %


 


Neutrophils #  9.5 H   (1.3-7.7)  k/uL


 


Sodium   135 L  (137-145)  mmol/L


 


Chloride   95 L  ()  mmol/L














Assessment and Plan


Plan: 





1patient was in the hospital with abdominal pain patient noticed to be tender 

on current examination and noticed to have abnormal CT with evidence of 

perforated appendicitis and contained leak with elevated white count and left 

shift we will need to cover for the enteric gram-negative both aerobes and 

anaerobes to the likely pathogen


2-Zosyn 3.375 g every 8 hours should provide adequate empiric coverage at this 

point


3-await plan for either surgical drainage versus CT-guided drainage and deep 

culture


Questions concerns answered in layman term


We will follow on clinical condition and cultures to further adjust medication 

if needed


Thank you for this consultation we will follow the patient along with you


Time with Patient: Greater than 30

## 2023-06-23 NOTE — P.GSHP
History of Present Illness


H&P Date: 06/23/23





CHIEF COMPLAINT: Abdominal pain





HISTORY OF PRESENT ILLNESS: This is a 52-year-old male who presented to the 

hospital with complaints of right lower quadrant abdominal pain 5 days ago.  

Patient reports that the pain initially started in the middle of his abdomen and

radiated down to the right lower quadrant.  Patient admits to having nausea no 

vomiting.  He reports not having any fever or chills.  Patient reports that the 

pain was worse with movement.  He went to see his PCP today to have some sutures

removed from his right leg and at that point talked with his PCP about right 

lower quadrant pain and was recommended to come to the ER for further 

evaluation.  In the ER patient had a computed tomography scan completed that was

positive for acute appendicitis complicated by perforation and a 3.7 cm area of 

phlegmon/contained leak.  Patient denies any cardiac history.  Denies any prior 

abdominal surgical history.





PAST MEDICAL HISTORY: 


See list.





PAST SURGICAL HISTORY: 


See list.





MEDICATIONS: 


See list.





ALLERGIES: 


See list.





SOCIAL HISTORY: No illicit drug use.  





REVIEW OF SYSTEMS: 


CONSTITUTIONAL: Denies fever or chills.


HEENT: Denies blurred vision, vision changes, or eye pain. Denies hemoptysis 


ENDOCRINE: Denies heat or cold intolerance.


CARDIOVASCULAR: Denies chest pain or pressure.


RESPIRATORY: No shortness of breath. 


GASTROINTESTINAL: Please refer to HPI


NEURO: Denies history of seizures.


PSYCH: No depression or suicidal ideation


HEMATOLOGIC: Denies bleeding disorders.


LYMPHATIC:  The patient denies any lumps and bumps around the neck. 


GENITOURINARY:  Denies any blood in urine or increased urinary frequency.  


MUSCULOSKELETAL:  Denies myalgias. Denies joint swelling. Denies decreased range

of motion beyond patients baseline.


SKIN: Denies pruitis. Denies rash.





PHYSICAL EXAM: 


VITAL SIGNS: Reviewed


GENERAL: Well-developed in no acute distress. 


HEENT:  No sclera icterus. Extraocular movements grossly intact.  Moist buccal 

mucosa. 


Head is atraumatic, normocephalic. Hears conversational speech. No nasal 

drainage.


NECK:  Supple without lymphadenopathy.


CHEST:  Non-labored respirations and equal bilateral excursions. 


CARDIOVASCULAR:  Palpable 2+ radial pulses.


ABDOMEN:  Soft.  Nondistended.  Tenderness palpation right lower quadrant


MUSCULOSKELETAL:  No clubbing or cyanosis.


NEUROLOGIC:  No focal or lateralizing signs.  Cranial nerves II through XII 

grossly intact.


PSYCH:  Appropriate affect.  Alert and oriented to person, place and time.


SKIN: Well perfused.  Good skin turgor. 





LABORATORY DATA:


CBC CMP and lactic acid and blood cultures are pending





IMAGING:


CAT scan findings as stated above








ASSESSMENT: 


1.  Acute appendicitis with perforation and 3.7 cm area of phlegmon/contained 

leak


2.  History of asthma 





PLAN: 


-Keep patient nothing by mouth


-Start IV antibiotics


-Consult infectious disease


-Continue IV fluids


-Continue pain management 


-Continue antiemetics


-Continue to monitor


-GI prophylaxis Protonix and DVT prophylaxis subcu heparin





Physician Assistant note has been reviewed by physician. Signing provider agrees

with the documented findings, assessment, and plan of care. 





Past Medical History


Past Medical History: Asthma, Osteoarthritis (OA)


Additional Past Medical History / Comment(s): Varicose veins.


History of Any Multi-Drug Resistant Organisms: None Reported


Past Surgical History: Hernia Repair, Orthopedic Surgery


Additional Past Surgical History / Comment(s): Knuckle replacement, deviated 

septum surgery, tlk 10/26/22


Past Anesthesia/Blood Transfusion Reactions: No Reported Reaction


Past Psychological History: No Psychological Hx Reported


Smoking Status: Former smoker


Past Alcohol Use History: Rare


Past Drug Use History: None Reported





- Past Family History


  ** Mother


Family Medical History: No Reported History





Medications and Allergies


                                Home Medications











 Medication  Instructions  Recorded  Confirmed  Type


 


Ibuprofen 800 mg PO Q8H #32 tab 10/28/22 04/24/23 Rx


 


Buprenorphine-Nalox 8-2 mg Tab 1 tab SUBLINGUAL DAILY 04/21/23 04/24/23 History





[Suboxone 8-2 mg Tab]    


 


Linaclotide [Linzess] 145 mcg PO DAILY 04/21/23 04/24/23 History


 


Semaglutide [Ozempic] 2 mg SQ MO 04/21/23 04/24/23 History


 


hydroCHLOROthiazide [Hydrodiuril] 25 mg PO DAILY 04/21/23 04/24/23 History


 


Aspirin [Adult Low Dose Aspirin EC] 81 mg PO BID #60 tab 04/25/23  Rx


 


Gabapentin 300 mg PO TID #24 cap 04/25/23  Rx


 


Ibuprofen 800 mg PO Q8H #40 tab 04/25/23  Rx


 


Sennosides/Docusate Sodium [Senna 1 each PO DAILY #20 capsule 04/25/23  Rx





Plus 8.6-50 mg Softgel]    








                                    Allergies











Allergy/AdvReac Type Severity Reaction Status Date / Time


 


No Known Allergies Allergy   Verified 06/23/23 13:38














Surgical - Exam


                                   Vital Signs











Temp Pulse Resp BP Pulse Ox


 


 98.3 F   79   18   107/73   98 


 


 06/23/23 13:35  06/23/23 13:35  06/23/23 13:35  06/23/23 13:35  06/23/23 13:35

## 2023-06-23 NOTE — ED
General Adult HPI





- General


Stated complaint: Abd Pain


Time Seen by Provider: 06/23/23 13:35


Source: patient, RN notes reviewed


Mode of arrival: ambulatory


Limitations: no limitations





- History of Present Illness


Initial comments: 


52-year-old male presents emergency Department with chief complaint of abnormal 

CT.  Patient states she started having abdominal pain that was more diffuse on 

Monday states is quite severe has not located the right lower abdomen.  Denies 

any reported fever.  Patient states that he's had no prior abdominal surgeries 

he has had knee surgery 2 months ago.  He was seen at his PCPs office and was 

sent for outpatient CT showing evidence of acute appendicitis.  Patient has NO 

KNOWN DRUG ALLERGIES.








- Related Data


                                Home Medications











 Medication  Instructions  Recorded  Confirmed


 


Buprenorphine-Nalox 8-2 mg Tab 1 tab SUBLINGUAL DAILY 04/21/23 04/24/23





[Suboxone 8-2 mg Tab]   


 


Linaclotide [Linzess] 145 mcg PO DAILY 04/21/23 04/24/23


 


Semaglutide [Ozempic] 2 mg SQ MO 04/21/23 04/24/23


 


hydroCHLOROthiazide [Hydrodiuril] 25 mg PO DAILY 04/21/23 04/24/23








                                  Previous Rx's











 Medication  Instructions  Recorded


 


Ibuprofen 800 mg PO Q8H #32 tab 10/28/22


 


Aspirin [Adult Low Dose Aspirin EC] 81 mg PO BID #60 tab 04/25/23


 


Gabapentin 300 mg PO TID #24 cap 04/25/23


 


Ibuprofen 800 mg PO Q8H #40 tab 04/25/23


 


Sennosides/Docusate Sodium [Senna 1 each PO DAILY #20 capsule 04/25/23





Plus 8.6-50 mg Softgel]  











                                    Allergies











Allergy/AdvReac Type Severity Reaction Status Date / Time


 


No Known Allergies Allergy   Verified 06/23/23 13:38














Review of Systems


ROS Statement: 


Those systems with pertinent positive or pertinent negative responses have been 

documented in the HPI.





ROS Other: All systems not noted in ROS Statement are negative.





Past Medical History


Past Medical History: Asthma, Osteoarthritis (OA)


Additional Past Medical History / Comment(s): Varicose veins.


History of Any Multi-Drug Resistant Organisms: None Reported


Past Surgical History: Hernia Repair, Orthopedic Surgery


Additional Past Surgical History / Comment(s): Knuckle replacement, deviated 

septum surgery, tlk 10/26/22


Past Anesthesia/Blood Transfusion Reactions: No Reported Reaction


Past Psychological History: No Psychological Hx Reported


Smoking Status: Former smoker


Past Alcohol Use History: Rare


Past Drug Use History: None Reported





- Past Family History


  ** Mother


Family Medical History: No Reported History





General Exam


Limitations: no limitations


General appearance: alert, in no apparent distress


Head exam: Present: atraumatic, normocephalic, normal inspection


Eye exam: Present: normal appearance, PERRL, EOMI.  Absent: scleral icterus, 

conjunctival injection, periorbital swelling


Neck exam: Present: normal inspection.  Absent: tenderness, meningismus, 

lymphadenopathy


Respiratory exam: Present: normal lung sounds bilaterally.  Absent: respiratory 

distress, wheezes, rales, rhonchi, stridor


Cardiovascular Exam: Present: regular rate, normal rhythm, normal heart sounds. 

Absent: systolic murmur, diastolic murmur, rubs, gallop, clicks


GI/Abdominal exam: Present: soft, tenderness (Right lower quadrant), guarding, 

normal bowel sounds.  Absent: distended, rebound, rigid





Course


                                   Vital Signs











  06/23/23 06/23/23





  13:35 14:00


 


Temperature 98.3 F 


 


Pulse Rate 79 79


 


Respiratory 18 18





Rate  


 


Blood Pressure 107/73 101/83


 


O2 Sat by Pulse 98 97





Oximetry  














Medical Decision Making





- Medical Decision Making


Was pt. sent in by a medical professional or institution (, PA, NP, urgent 

care, hospital, or nursing home...) When possible be specific


@  -PCP


Did you speak to anyone other than the patient for history (EMS, parent, family,

police, friend...)? What history was obtained from this source 


@  -No


Did you review nursing and triage notes (agree or disagree)?  Why? 


@  -I reviewed and agree with nursing and triage notes


Were old charts reviewed (outside hosp., previous admission, EMS record, old 

EKG, old radiological studies, urgent care reports/EKG's, nursing home records)?

Report findings 


@  -No old charts were reviewed


Differential Diagnosis (chest pain, altered mental status, abdominal pain women,

abdominal pain men, vaginal bleeding, weakness, fever, dyspnea, syncope, 

headache, dizziness, GI bleed, back pain, seizure, CVA, palpatations, mental 

health, musculoskeletal)? 


@  -nDifferential Abdominal Pain Men:


Appendicitis, cholecystitis, diverticulosis, ischemic bowel, pancreatitis, 

hepatitis, UTI, gastroenteritis, AAA, incarcerated hernia, bowel obstruction, 

constipation, inflammatory bowel, hepatitis, peptic ulcer disease, splenic 

infarction, perforated viscus, testicular torsion, this is not meant to be an 

all-inclusive listble


EKG interpreted by me (3pts min.).


@  -None


X-rays interpreted by me (1pt min.).


@  -None done


CT interpreted by me (1pt min.).


@  -CT of the pelvis shows evidence of acute appendicitis with perforation, 

contain phlegmon


U/S interpreted by me (1pt. min.).


@  -None done


What testing was considered but not performed or refused? (CT, X-rays, U/S, 

labs)? Why?


@  -None


What meds were considered but not given or refused? Why?


@  -None


Did you discuss the management of the patient with other professionals 

(professionals i.e. , PA, NP, lab, RT, psych nurse, , , 

teacher, , )? Give summary


@  -Case discussed with Dr. Tapia for admission for acute appendicitis with 

perforation she did request having ID consult.  Patient was started on IV 

antibiotics.


Was smoking cessation discussed for >3mins.?


@  -No


Was critical care preformed (if so, how long)?


@  -No


Were there social determinants of health that impacted care today? How? (Ho

melessness, low income, unemployed, alcoholism, drug addiction, transportation, 

low edu. Level, literacy, decrease access to med. care, longterm, rehab)?


@  -No


Was there de-escalation of care discussed even if they declined (Discuss DNR or 

withdrawal of care, Hospice)? DNR status


@  -No


What co-morbidities impacted this encounter? (DM, HTN, Smoking, COPD, CAD, 

Cancer, CVA, ARF, Chemo, Hep., AIDS, mental health diagnosis, sleep apnea, 

morbid obesity)?


@  -None


Was patient admitted / discharged? Hospital course, mention meds given and 

route, prescriptions, significant lab abnormalities, going to OR and other 

pertinent info.


@  -Patient was admitted to surgery with consult to IV patient was started on IV

fluids, antibiotics labs were drawn. 


Undiagnosed new problem with uncertain prognosis?


@  -No


Drug Therapy requiring intensive monitoring for toxicity (Heparin, Nitro, 

Insulin, Cardizem)?


@  -No


Were any procedures done?


@  -No


Diagnosis/symptom?


@  -Acute appendicitis with perforation


Acute, or Chronic, or Acute on Chronic?


@  -Acute


Uncomplicated (without systemic symptoms) or Complicated (systemic symptoms)?


@  -, Complicated


Side effects of treatment?


@  -No


Exacerbation, Progression, or Severe Exacerbation?


@  -No


Poses a threat to life or bodily function? How? (Chest pain, USA, MI, pneumonia,

PE, COPD, DKA, ARF, appy, cholecystitis, CVA, Diverticulitis, Homicidal, 

Suicidal, threat to staff... and all critical care pts)


@  -Yes patient has perforated appendicitis also requiring surgical intervention

and surgical risk








Disposition


Clinical Impression: 


 Acute appendicitis with rupture





Disposition: ADMITTED AS IP TO THIS HOSP


Condition: Fair


Referrals: 


Adal Odonnell MD [Primary Care Provider] - 1-2 days


Time of Disposition: 14:00

## 2023-06-24 LAB
BASOPHILS # BLD AUTO: 0 K/UL (ref 0–0.2)
BASOPHILS NFR BLD AUTO: 0 %
EOSINOPHIL # BLD AUTO: 0.2 K/UL (ref 0–0.7)
EOSINOPHIL NFR BLD AUTO: 2 %
ERYTHROCYTE [DISTWIDTH] IN BLOOD BY AUTOMATED COUNT: 4.01 M/UL (ref 4.3–5.9)
ERYTHROCYTE [DISTWIDTH] IN BLOOD: 14.7 % (ref 11.5–15.5)
HCT VFR BLD AUTO: 32.8 % (ref 39–53)
HGB BLD-MCNC: 10.7 GM/DL (ref 13–17.5)
LYMPHOCYTES # SPEC AUTO: 1.4 K/UL (ref 1–4.8)
LYMPHOCYTES NFR SPEC AUTO: 16 %
MCH RBC QN AUTO: 26.6 PG (ref 25–35)
MCHC RBC AUTO-ENTMCNC: 32.6 G/DL (ref 31–37)
MCV RBC AUTO: 81.7 FL (ref 80–100)
MONOCYTES # BLD AUTO: 0.5 K/UL (ref 0–1)
MONOCYTES NFR BLD AUTO: 6 %
NEUTROPHILS # BLD AUTO: 6.5 K/UL (ref 1.3–7.7)
NEUTROPHILS NFR BLD AUTO: 73 %
PLATELET # BLD AUTO: 329 K/UL (ref 150–450)
WBC # BLD AUTO: 8.8 K/UL (ref 3.8–10.6)

## 2023-06-24 RX ADMIN — PIPERACILLIN AND TAZOBACTAM SCH MLS/HR: 3; .375 INJECTION, POWDER, FOR SOLUTION INTRAVENOUS at 12:13

## 2023-06-24 RX ADMIN — CEFAZOLIN SCH: 330 INJECTION, POWDER, FOR SOLUTION INTRAMUSCULAR; INTRAVENOUS at 23:25

## 2023-06-24 RX ADMIN — PIPERACILLIN AND TAZOBACTAM SCH MLS/HR: 3; .375 INJECTION, POWDER, FOR SOLUTION INTRAVENOUS at 04:13

## 2023-06-24 RX ADMIN — CEFAZOLIN SCH MLS/HR: 330 INJECTION, POWDER, FOR SOLUTION INTRAMUSCULAR; INTRAVENOUS at 05:32

## 2023-06-24 RX ADMIN — PIPERACILLIN AND TAZOBACTAM SCH MLS/HR: 3; .375 INJECTION, POWDER, FOR SOLUTION INTRAVENOUS at 20:48

## 2023-06-24 RX ADMIN — HEPARIN SODIUM SCH UNIT: 5000 INJECTION INTRAVENOUS; SUBCUTANEOUS at 08:00

## 2023-06-24 RX ADMIN — HEPARIN SODIUM SCH: 5000 INJECTION INTRAVENOUS; SUBCUTANEOUS at 20:49

## 2023-06-24 RX ADMIN — PANTOPRAZOLE SODIUM SCH MG: 40 INJECTION, POWDER, FOR SOLUTION INTRAVENOUS at 08:00

## 2023-06-24 RX ADMIN — CEFAZOLIN SCH: 330 INJECTION, POWDER, FOR SOLUTION INTRAMUSCULAR; INTRAVENOUS at 14:23

## 2023-06-24 NOTE — P.PN
Subjective


Progress Note Date: 06/24/23





WBC down to normal.  Reports dull right lower quadrant abdominal pain.  

Tolerating regular diet.  Continue hospitalization for IV antibiotics.  

Abdominal pain should resolve prior to discharge.  Interval appendectomy 

described.





Objective





- Vital Signs


Vital signs: 


                                   Vital Signs











Temp  98.1 F   06/24/23 11:07


 


Pulse  57 L  06/24/23 11:07


 


Resp  18   06/24/23 11:07


 


BP  95/59   06/24/23 11:07


 


Pulse Ox  97   06/24/23 11:07


 


FiO2      








                                 Intake & Output











 06/23/23 06/24/23 06/24/23





 18:59 06:59 18:59


 


Output Total  800 


 


Balance  -800 


 


Weight 120.202 kg 120.202 kg 


 


Output:   


 


  Urine  800 














- Labs


CBC & Chem 7: 


                                 06/24/23 11:41





                                 06/23/23 14:00


Labs: 


                  Abnormal Lab Results - Last 24 Hours (Table)











  06/24/23 Range/Units





  11:41 


 


RBC  4.01 L  (4.30-5.90)  m/uL


 


Hgb  10.7 L  (13.0-17.5)  gm/dL


 


Hct  32.8 L  (39.0-53.0)  %

## 2023-06-25 RX ADMIN — PIPERACILLIN AND TAZOBACTAM SCH MLS/HR: 3; .375 INJECTION, POWDER, FOR SOLUTION INTRAVENOUS at 04:47

## 2023-06-25 RX ADMIN — HEPARIN SODIUM SCH: 5000 INJECTION INTRAVENOUS; SUBCUTANEOUS at 11:18

## 2023-06-25 RX ADMIN — PANTOPRAZOLE SODIUM SCH MG: 40 INJECTION, POWDER, FOR SOLUTION INTRAVENOUS at 08:54

## 2023-06-25 RX ADMIN — KETOROLAC TROMETHAMINE SCH: 15 INJECTION, SOLUTION INTRAMUSCULAR; INTRAVENOUS at 14:52

## 2023-06-25 RX ADMIN — KETOROLAC TROMETHAMINE SCH: 15 INJECTION, SOLUTION INTRAMUSCULAR; INTRAVENOUS at 17:57

## 2023-06-25 RX ADMIN — CEFAZOLIN SCH MLS/HR: 330 INJECTION, POWDER, FOR SOLUTION INTRAMUSCULAR; INTRAVENOUS at 04:51

## 2023-06-25 RX ADMIN — PIPERACILLIN AND TAZOBACTAM SCH MLS/HR: 3; .375 INJECTION, POWDER, FOR SOLUTION INTRAVENOUS at 19:57

## 2023-06-25 RX ADMIN — PIPERACILLIN AND TAZOBACTAM SCH MLS/HR: 3; .375 INJECTION, POWDER, FOR SOLUTION INTRAVENOUS at 12:15

## 2023-06-25 RX ADMIN — CEFAZOLIN SCH: 330 INJECTION, POWDER, FOR SOLUTION INTRAMUSCULAR; INTRAVENOUS at 12:17

## 2023-06-25 RX ADMIN — HEPARIN SODIUM SCH UNIT: 5000 INJECTION INTRAVENOUS; SUBCUTANEOUS at 19:57

## 2023-06-25 NOTE — P.PN
Subjective


Progress Note Date: 06/24/23


Principal diagnosis: 


Acute perforated appendicitis





Patient is a 52-year-old  male with a past medical history significant 

for asthma and osteoarthritis has been sent to the hospital after the patient 

was noticed to have abnormal CT abdominal pelvis with  evidence of acute 

appendicitis complicated by perforation and 3.7 cm area of phlegmon contained 

leak.


On today's evaluation that is 06/24/2023, the patient denies having any fever or

any chills, still complaining of right lower quadrant abdominal pain about the 

same as yesterday, no nausea no vomiting no diarrhea no chest pain shortness of 

breath or cough








Objective





- Vital Signs


Vital signs: 


                                   Vital Signs











Temp  98.1 F   06/24/23 11:07


 


Pulse  57 L  06/24/23 11:07


 


Resp  18   06/24/23 11:07


 


BP  95/59   06/24/23 11:07


 


Pulse Ox  97   06/24/23 11:07


 


FiO2      








                                 Intake & Output











 06/23/23 06/24/23 06/24/23





 18:59 06:59 18:59


 


Output Total  800 


 


Balance  -800 


 


Weight 120.202 kg 120.202 kg 


 


Output:   


 


  Urine  800 














- Exam


GENERAL DESCRIPTION: Middle-age male lying in bed in no distress





RESPIRATORY SYSTEM: Unlabored breathing , decreased breath sounds at bases





HEART: S1 S2 regular rate and rhythm ,





ABDOMEN: Soft , mild right lower quadrant tenderness





EXTREMITIES: No edema feet








- Labs


CBC & Chem 7: 


                                 06/24/23 11:41





                                 06/23/23 14:00


Labs: 


                  Abnormal Lab Results - Last 24 Hours (Table)











  06/23/23 06/23/23 06/24/23 Range/Units





  14:00 14:00 11:41 


 


WBC  11.8 H    (3.8-10.6)  k/uL


 


RBC    4.01 L  (4.30-5.90)  m/uL


 


Hgb  12.0 L   10.7 L  (13.0-17.5)  gm/dL


 


Hct  36.8 L   32.8 L  (39.0-53.0)  %


 


Neutrophils #  9.5 H    (1.3-7.7)  k/uL


 


Sodium   135 L   (137-145)  mmol/L


 


Chloride   95 L   ()  mmol/L














Assessment and Plan


(1) Acute appendicitis with rupture


Current Visit: Yes   Status: Acute   Code(s): K35.32 - AC APPENDICITIS W PERF, 

LOC PERITONITIS, & GANGR, W/O ABSCS   SNOMED Code(s): 45887515


   


Plan: 





1patient was in the hospital with abdominal pain patient noticed to be tender 

on current examination and noticed to have abnormal CT with evidence of 

perforated appendicitis and contained leak with elevated white count and left 

shift we will need to cover for the enteric gram-negative both aerobes and 

anaerobes to the likely pathogen


2-patient to continue Zosyn 3.375 g every 8 hours and monitor clinical course 

closely


Time with Patient: Less than 30

## 2023-06-25 NOTE — P.PN
Subjective


Progress Note Date: 06/25/23


Principal diagnosis: 





Abdominal pain is improving right lower quadrant.  No fevers or chills.  

Continue IV antibiotics.  Repeat CBC and CMP





Objective





- Vital Signs


Vital signs: 


                                   Vital Signs











Temp  98.6 F   06/25/23 19:17


 


Pulse  63   06/25/23 19:17


 


Resp  15   06/25/23 19:17


 


BP  104/67   06/25/23 19:17


 


Pulse Ox  96   06/25/23 19:17


 


FiO2      








                                 Intake & Output











 06/25/23 06/25/23 06/26/23





 06:59 18:59 06:59


 


Intake Total  1560 118


 


Output Total 1000  


 


Balance -1000 1560 118


 


Intake:   


 


  Intake, IV Titration  1560 





  Amount   


 


    Sodium Chloride 0.9% 1,  1560 





    000 ml @ 130 mls/hr IV .   





    [id number] [facility] Rx#:[id number]   


 


  Oral   118


 


Output:   


 


  Urine 1000  














- Labs


CBC & Chem 7: 


                                 06/24/23 11:41





                                 06/23/23 14:00


Labs: 


                      Microbiology - Last 24 Hours (Table)











 06/23/23 13:45 Blood Culture - Preliminary





 Blood 


 


 06/23/23 14:00 Blood Culture - Preliminary





 Blood

## 2023-06-25 NOTE — P.PN
Subjective


Progress Note Date: 06/25/23


Principal diagnosis: 


Acute perforated appendicitis





Patient is a 52-year-old  male with a past medical history significant 

for asthma and osteoarthritis has been sent to the hospital after the patient 

was noticed to have abnormal CT abdominal pelvis with  evidence of acute 

appendicitis complicated by perforation and 3.7 cm area of phlegmon contained 

leak.


On today's evaluation that is 06/25/2023, the patient remains to be afebrile, 

the patient right lower quadrant abdominal pain has slightly decreased in 

intensity, no nausea no vomiting no diarrhea no chest pain shortness of breath 

or cough








Objective





- Vital Signs


Vital signs: 


                                   Vital Signs











Temp  98.4 F   06/25/23 12:40


 


Pulse  65   06/25/23 12:40


 


Resp  16   06/25/23 12:40


 


BP  109/67   06/25/23 12:40


 


Pulse Ox  97   06/25/23 12:40


 


FiO2      








                                 Intake & Output











 06/24/23 06/25/23 06/25/23





 18:59 06:59 18:59


 


Output Total  1000 


 


Balance  -1000 


 


Output:   


 


  Urine  1000 














- Exam


GENERAL DESCRIPTION: Middle-age male lying in bed in no distress





RESPIRATORY SYSTEM: Unlabored breathing , decreased breath sounds at bases





HEART: S1 S2 regular rate and rhythm ,





ABDOMEN: Soft , mild right lower quadrant tenderness





EXTREMITIES: No edema feet








- Labs


CBC & Chem 7: 


                                 06/24/23 11:41





                                 06/23/23 14:00


Labs: 


                      Microbiology - Last 24 Hours (Table)











 06/23/23 13:45 Blood Culture - Preliminary





 Blood 


 


 06/23/23 14:00 Blood Culture - Preliminary





 Blood 














Assessment and Plan


(1) Acute appendicitis with rupture


Current Visit: Yes   Status: Acute   Code(s): K35.32 - AC APPENDICITIS W PERF, 

LOC PERITONITIS, & GANGR, W/O ABSCS   SNOMED Code(s): 46400583


   


Plan: 





1patient was in the hospital with abdominal pain patient noticed to be tender 

on current examination and noticed to have abnormal CT with evidence of 

perforated appendicitis and contained leak with elevated white count and left 

shift we will need to cover for the enteric gram-negative both aerobes and 

anaerobes to the likely pathogen


2-patient is afebrile and white count has normalized, to continue Zosyn 3.375 g 

every 8 hours and plan for possible outpatient IV antibiotic on discharge were 

discussed with the surgical team and  for coverage





Time with Patient: Less than 30

## 2023-06-26 VITALS
TEMPERATURE: 98.6 F | RESPIRATION RATE: 20 BRPM | SYSTOLIC BLOOD PRESSURE: 104 MMHG | DIASTOLIC BLOOD PRESSURE: 59 MMHG | HEART RATE: 58 BPM

## 2023-06-26 LAB
ALBUMIN SERPL-MCNC: 3.6 D/DL (ref 3.8–4.9)
ALBUMIN/GLOB SERPL: 1.5 RATIO (ref 1.6–3.17)
ALP SERPL-CCNC: 102 U/L (ref 41–126)
ALT SERPL-CCNC: 18 U/L (ref 10–49)
ANION GAP SERPL CALC-SCNC: 12.5 MMOL/L (ref 4–12)
AST SERPL-CCNC: 10 U/L (ref 14–35)
BASOPHILS # BLD AUTO: 0.06 X 10*3/UL (ref 0–0.1)
BASOPHILS NFR BLD AUTO: 0.7 %
BUN SERPL-SCNC: 7.8 MG/DL (ref 9–27)
BUN/CREAT SERPL: 8.67 RATIO (ref 12–20)
CALCIUM SPEC-MCNC: 9.3 MG/DL (ref 8.7–10.3)
CHLORIDE SERPL-SCNC: 102 MMOL/L (ref 96–109)
CO2 SERPL-SCNC: 28.5 MMOL/L (ref 21.6–31.8)
EOSINOPHIL # BLD AUTO: 0.21 X 10*3/UL (ref 0.04–0.35)
EOSINOPHIL NFR BLD AUTO: 2.3 %
ERYTHROCYTE [DISTWIDTH] IN BLOOD BY AUTOMATED COUNT: 4.06 X 10*6/UL (ref 4.4–5.6)
ERYTHROCYTE [DISTWIDTH] IN BLOOD: 14.4 % (ref 11.5–14.5)
GLOBULIN SER CALC-MCNC: 2.4 D/DL (ref 1.6–3.3)
GLUCOSE SERPL-MCNC: 85 MG/DL (ref 70–110)
HCT VFR BLD AUTO: 34 % (ref 39.6–50)
HGB BLD-MCNC: 10.1 D/DL (ref 12–15)
IMM GRANULOCYTES BLD QL AUTO: 0.3 %
LYMPHOCYTES # SPEC AUTO: 2.06 X 10*3/UL (ref 0.9–5)
LYMPHOCYTES NFR SPEC AUTO: 23 %
MCH RBC QN AUTO: 24.9 PG (ref 27–32)
MCHC RBC AUTO-ENTMCNC: 29.7 D/DL (ref 32–37)
MCV RBC AUTO: 83.7 FL (ref 80–97)
MONOCYTES # BLD AUTO: 0.86 X 10*3/UL (ref 0.2–1)
MONOCYTES NFR BLD AUTO: 9.6 %
NEUTROPHILS # BLD AUTO: 5.72 X 10*3/UL (ref 1.8–7.7)
NEUTROPHILS NFR BLD AUTO: 64.1 %
NRBC BLD AUTO-RTO: 0 X 10*3/UL (ref 0–0.01)
PLATELET # BLD AUTO: 339 X 10*3/UL (ref 140–440)
POTASSIUM SERPL-SCNC: 4.3 MMOL/L (ref 3.5–5.5)
PROT SERPL-MCNC: 6 D/DL (ref 6.2–8.2)
SODIUM SERPL-SCNC: 143 MMOL/L (ref 135–145)
WBC # BLD AUTO: 8.94 X 10*3/UL (ref 4.5–10)

## 2023-06-26 RX ADMIN — KETOROLAC TROMETHAMINE SCH: 15 INJECTION, SOLUTION INTRAMUSCULAR; INTRAVENOUS at 01:10

## 2023-06-26 RX ADMIN — CEFAZOLIN SCH MLS/HR: 330 INJECTION, POWDER, FOR SOLUTION INTRAMUSCULAR; INTRAVENOUS at 02:22

## 2023-06-26 RX ADMIN — CEFAZOLIN SCH: 330 INJECTION, POWDER, FOR SOLUTION INTRAMUSCULAR; INTRAVENOUS at 11:37

## 2023-06-26 RX ADMIN — CEFAZOLIN SCH: 330 INJECTION, POWDER, FOR SOLUTION INTRAMUSCULAR; INTRAVENOUS at 02:23

## 2023-06-26 RX ADMIN — PANTOPRAZOLE SODIUM SCH MG: 40 INJECTION, POWDER, FOR SOLUTION INTRAVENOUS at 08:55

## 2023-06-26 RX ADMIN — PIPERACILLIN AND TAZOBACTAM SCH MLS/HR: 3; .375 INJECTION, POWDER, FOR SOLUTION INTRAVENOUS at 11:35

## 2023-06-26 RX ADMIN — KETOROLAC TROMETHAMINE SCH: 15 INJECTION, SOLUTION INTRAMUSCULAR; INTRAVENOUS at 05:08

## 2023-06-26 RX ADMIN — KETOROLAC TROMETHAMINE SCH: 15 INJECTION, SOLUTION INTRAMUSCULAR; INTRAVENOUS at 11:36

## 2023-06-26 RX ADMIN — HEPARIN SODIUM SCH: 5000 INJECTION INTRAVENOUS; SUBCUTANEOUS at 08:55

## 2023-06-26 RX ADMIN — PIPERACILLIN AND TAZOBACTAM SCH MLS/HR: 3; .375 INJECTION, POWDER, FOR SOLUTION INTRAVENOUS at 03:34

## 2023-06-26 NOTE — P.DS
Providers


Date of admission: 


06/23/23 14:00





Expected date of discharge: 06/26/23


Attending physician: 


Aurelia Tapia





Consults: 





                                        





06/23/23 14:00


Consult Physician Urgent 


   Consulting Provider: Ian Miller


   Consult Reason/Comments: Ruptured appendicitis


   Do you want consulting provider notified?: Yes











Primary care physician: 


Adal QUINN [facility] Course: 





Discharge diagnosis


1.  Acute appendicitis with perforation and 3.7 cm area of phlegmon/contained 

leak


2.  History of asthma 





Hospital course


This is a 52-year-old male who presented with complaints of right lower quadrant

abdominal pain. Computed tomography scan completed that was positive for acute 

appendicitis complicated by perforation and a 3.7 cm area of phlegmon/contained 

leak.  Patient was started on IV antibiotics.  His pain has resolved.  He is 

tolerating diet.  He is having bowel movements.  His white count has normalized.

 He is afebrile.  He has been up and ambulating.  He was seen by infectious 

disease.  Infectious disease did recommend IV antibiotics the patient refused.  

Patient will be discharged home with oral antibiotics.  Patient is stable for 

discharge.





Physician Assistant note has been reviewed by physician. Signing provider agrees

with the documented findings, assessment, and plan of care. 


Patient Condition at Discharge: Stable





Plan - Discharge Summary


Discharge Rx Participant: No


New Discharge Prescriptions: 


New


   Amoxic-Pot Clav 875-125Mg [Augmentin 875-125] 1 tab PO Q12HR 14 Days #28 tab


   Ibuprofen [Motrin] 600 mg PO Q8HR PRN #30 tab


     PRN Reason: Pain


   Acetaminophen Tab [Tylenol] 1,000 mg PO Q6HR PRN #30 tablet


     PRN Reason: Pain





Continue


   Semaglutide [Ozempic] 2 mg SQ MO


   Linaclotide [Linzess] 145 mcg PO DAILY





No Action


   Lisinopril-Hctz 20-25 mg [Zestoretic 20-25] 1 tab PO DAILY


Discharge Medication List





Linaclotide [Linzess] 145 mcg PO DAILY 04/21/23 [History]


Semaglutide [Ozempic] 2 mg SQ MO 04/21/23 [History]


Lisinopril-Hctz 20-25 mg [Zestoretic 20-25] 1 tab PO DAILY 06/23/23 [History]


Acetaminophen Tab [Tylenol] 1,000 mg PO Q6HR PRN #30 tablet 06/26/23 [Rx]


Amoxic-Pot Clav 875-125Mg [Augmentin 875-125] 1 tab PO Q12HR 14 Days #28 tab 

06/26/23 [Rx]


Ibuprofen [Motrin] 600 mg PO Q8HR PRN #30 tab 06/26/23 [Rx]








Follow up Appointment(s)/Referral(s): 


Adal Odonnell MD [Primary Care Provider] - 1-2 days


Aurelia Tapia MD [STAFF PHYSICIAN] - 07/11/23


Activity/Diet/Wound Care/Special Instructions: 


Hold BP med until seen by PCP within the next 2 days


Discharge Disposition: HOME SELF-CARE

## 2023-07-21 ENCOUNTER — HOSPITAL ENCOUNTER (OUTPATIENT)
Dept: HOSPITAL 47 - OR | Age: 53
Discharge: HOME | End: 2023-07-21
Attending: SURGERY
Payer: COMMERCIAL

## 2023-07-21 VITALS — BODY MASS INDEX: 34.2 KG/M2

## 2023-07-21 VITALS — TEMPERATURE: 97.4 F

## 2023-07-21 VITALS — DIASTOLIC BLOOD PRESSURE: 62 MMHG | HEART RATE: 54 BPM | SYSTOLIC BLOOD PRESSURE: 133 MMHG

## 2023-07-21 VITALS — RESPIRATION RATE: 16 BRPM

## 2023-07-21 DIAGNOSIS — Z79.899: ICD-10-CM

## 2023-07-21 DIAGNOSIS — M19.90: ICD-10-CM

## 2023-07-21 DIAGNOSIS — Z86.59: ICD-10-CM

## 2023-07-21 DIAGNOSIS — Z87.891: ICD-10-CM

## 2023-07-21 DIAGNOSIS — K35.32: Primary | ICD-10-CM

## 2023-07-21 DIAGNOSIS — J45.909: ICD-10-CM

## 2023-07-21 DIAGNOSIS — Z96.653: ICD-10-CM

## 2023-07-21 LAB
ALBUMIN SERPL-MCNC: 4.5 G/DL (ref 3.5–5)
ALP SERPL-CCNC: 153 U/L (ref 38–126)
ALT SERPL-CCNC: 24 U/L (ref 4–49)
ANION GAP SERPL CALC-SCNC: 11 MMOL/L
AST SERPL-CCNC: 21 U/L (ref 17–59)
BASOPHILS # BLD AUTO: 0.1 K/UL (ref 0–0.2)
BASOPHILS NFR BLD AUTO: 1 %
BUN SERPL-SCNC: 12 MG/DL (ref 9–20)
CALCIUM SPEC-MCNC: 9.8 MG/DL (ref 8.4–10.2)
CHLORIDE SERPL-SCNC: 95 MMOL/L (ref 98–107)
CO2 SERPL-SCNC: 30 MMOL/L (ref 22–30)
EOSINOPHIL # BLD AUTO: 0.2 K/UL (ref 0–0.7)
EOSINOPHIL NFR BLD AUTO: 2 %
ERYTHROCYTE [DISTWIDTH] IN BLOOD BY AUTOMATED COUNT: 5.16 M/UL (ref 4.3–5.9)
ERYTHROCYTE [DISTWIDTH] IN BLOOD: 14.7 % (ref 11.5–15.5)
GLUCOSE BLD-MCNC: 97 MG/DL (ref 70–110)
GLUCOSE SERPL-MCNC: 100 MG/DL (ref 74–99)
HCT VFR BLD AUTO: 41.3 % (ref 39–53)
HGB BLD-MCNC: 13.6 GM/DL (ref 13–17.5)
LYMPHOCYTES # SPEC AUTO: 2.1 K/UL (ref 1–4.8)
LYMPHOCYTES NFR SPEC AUTO: 22 %
MCH RBC QN AUTO: 26.4 PG (ref 25–35)
MCHC RBC AUTO-ENTMCNC: 33 G/DL (ref 31–37)
MCV RBC AUTO: 80 FL (ref 80–100)
MONOCYTES # BLD AUTO: 0.6 K/UL (ref 0–1)
MONOCYTES NFR BLD AUTO: 6 %
NEUTROPHILS # BLD AUTO: 6.3 K/UL (ref 1.3–7.7)
NEUTROPHILS NFR BLD AUTO: 68 %
PLATELET # BLD AUTO: 347 K/UL (ref 150–450)
POTASSIUM SERPL-SCNC: 3.4 MMOL/L (ref 3.5–5.1)
PROT SERPL-MCNC: 8.1 G/DL (ref 6.3–8.2)
SODIUM SERPL-SCNC: 136 MMOL/L (ref 137–145)
WBC # BLD AUTO: 9.3 K/UL (ref 3.8–10.6)

## 2023-07-21 PROCEDURE — 44970 LAPAROSCOPY APPENDECTOMY: CPT

## 2023-07-21 PROCEDURE — 88304 TISSUE EXAM BY PATHOLOGIST: CPT

## 2023-07-21 PROCEDURE — 85025 COMPLETE CBC W/AUTO DIFF WBC: CPT

## 2023-07-21 PROCEDURE — 80053 COMPREHEN METABOLIC PANEL: CPT

## 2023-07-21 RX ADMIN — POTASSIUM CHLORIDE SCH MLS: 14.9 INJECTION, SOLUTION INTRAVENOUS at 17:24

## 2023-07-21 RX ADMIN — POTASSIUM CHLORIDE SCH MLS: 14.9 INJECTION, SOLUTION INTRAVENOUS at 14:19

## 2023-07-21 NOTE — P.GSHP
History of Present Illness


H&P Date: 07/21/23














CHIEF COMPLAINT: 


Ruptured appendicitis   





HISTORY OF PRESENT ILLNESS:  


The patient is a 53-year-old male who presents with ruptured appendicitis 1 

month ago. He was treated with antibiotics. He presents for interval 

appendectomy.





PAST MEDICAL HISTORY:  


See list and reviewed





PAST SURGICAL HISTORY: 


See list and reviewed





CURRENT MEDICATIONS:  


See list and reviewed





ALLERGIES:  


See list and reviewed





SOCIAL HISTORY:


See list and reviewed





FAMILY HISTORY:


See list and reviewed





REVIEW OF ORGAN SYSTEMS:


CONSTITUTIONAL: Present fever, no chills.  


HEENT:  Denies any trouble with vision, hearing or nosebleeds.  No difficulty 

swallowing. 


LYMPHATIC:  The patient denies any lumps and bumps around the neck. 


ENDOCRINE:  Denies any thyroid disorders. Denies any blood sugar glucose 

intolerance.


RESPIRATORY:  Denies shortness of breath including chronic cough.  


CARDIOVASCULAR:  Denies history of chest pain with exertion.  


GASTROINTESTINAL:  Denies regurgitation of bile at night as well as intermittent

nausea. No blood in stools.


GENITOURINARY:  Denies any blood in urine or increased urinary frequency.  


MUSCULOSKELETAL:  Denies current joint arthritis.   


NEUROLOGIC:  Denies any numbness or tingling along the distal extremities. No 

seizure disorders or headaches.  


PSYCHIATRIC:  Denies any depression or suicidal ideation.


HEMATOLOGIC:  Denies any abnormal bleeding or bruising.       





PHYSICAL EXAMINATION:  


VITALS: Reviewed.





GENERAL: Well-developed and in no acute distress. Pleasant.


HEENT:  No sclera icterus. Extraocular movements grossly intact.  Moist buccal 

mucosa. Head is atraumatic, normocephalic. Hears conversational speech. No nasal

drainage.


NECK:  Supple without lymphadenopathy. No JV distention.


CHEST:  Non-labored respirations and equal bilateral excursions. 


CARDIOVASCULAR:  Regular rate and rhythm.  Palpable 2+ radial pulses.


ABDOMEN:  Soft, tender at the right lower quadrant without guarding.


MUSCULOSKELETAL:  No clubbing, cyanosis or edema.


NEUROLOGIC:  No focal or lateralizing signs. 


PSYCH:  Appropriate affect.  Alert and oriented to person, place and time.


SKIN: Well perfused.  Good skin turgor.





LABS: Reviewed.  





STUDIES: CT of the abdomen and pelvis reviewed with findings consistent with 

appendicitis.





ASSESSMENT:


1.   Right lower quadrant pain.


2.   Appendicitis with rupture








PLAN:


1.   I have discussed benefits and risks of robotic appendectomy.


2.   Bilateral SCDs.


3.   Antibiotics 


4.   He is elevated risk for prior ruptured appendectomy.





Past Medical History


Past Medical History: Asthma, Osteoarthritis (OA)


Additional Past Medical History / Comment(s): Varicose veins.


History of Any Multi-Drug Resistant Organisms: None Reported


Past Surgical History: Hernia Repair, Joint Replacement, Orthopedic Surgery


Additional Past Surgical History / Comment(s): Knuckle replacement, deviated 

septum surgery, bilateral total knee replacements.


Past Anesthesia/Blood Transfusion Reactions: No Reported Reaction


Past Psychological History: No Psychological Hx Reported


Smoking Status: Former smoker


Past Alcohol Use History: Rare


Additional Past Alcohol Use History / Comment(s): Quit smoking many yrs ago.


Past Drug Use History: None Reported





- Past Family History


  ** Mother


Family Medical History: No Reported History





Medications and Allergies


                                Home Medications











 Medication  Instructions  Recorded  Confirmed  Type


 


Linaclotide [Linzess] 145 mcg PO DAILY 04/21/23 07/19/23 History


 


Semaglutide [Ozempic] 2 mg SQ MO 04/21/23 07/19/23 History


 


Lisinopril-Hctz 20-25 mg 1 tab PO QAM 06/23/23 07/19/23 History





[Zestoretic 20-25]    








                                    Allergies











Allergy/AdvReac Type Severity Reaction Status Date / Time


 


No Known Allergies Allergy   Verified 07/19/23 09:49

## 2023-07-21 NOTE — P.OP
Date of Procedure: 07/21/23


Description of Procedure: 














SURGEON:  AURELIA TAPIA MD





Preoperative Diagnosis: 


1.  Acute appendicitis





Postoperative Diagnosis: 


1.  Acute appendicitis, retrocecal with periappendicitis, previous fracture





Procedure(s) Performed: 


1. Robotic-assisted daVinci Xi laparoscopic appendectomy





Anesthesia: GETA, local


Estimated Blood Loss (ml): 5


Pathology: other (appendix)


Condition: stable


Disposition: floor





Operative Findings: 


1.  Acute appendicitis previous rupture with periappendicitis, retrocecal


2.  Terminal ileum unremarkable


3.  Cecum unremarkable





INDICATIONS: The patient is a 53 year-old male who presents with acute 

appendicitis.  Benefits and risks, including infection, open surgery, and 

bleeding for additional surgery was discussed at length. Informed consent was 

obtained. All questions of the patient and family were answered.





DESCRIPTION:  The patient was transferred to the operating room and placed in 

supine position.  The patient had previously voided.  The abdomen was then 

prepped and draped in standard sterile fashion as Ioban was placed along the 

abdomen to minimize any contamination of skin floor. 





After a timeout protocol was performed, attention was then brought to the left 

upper quadrant whereby a 0 degree 5 mm laparoscopic trocar entry was performed. 

The abdominal cavity was entered and insufflated to 12 mmHg pressure, which was 

tolerated well. Diagnostic laparoscopy demonstrated no injury to bowel, viscera 

or mesentery.  





Next a robotic 8-mm trocar was placed along the left lower quadrant, 10-cm 

lateral to the midline. A 12 mm port was placed along the left upper quadrant 

and another 8-mm port left lateral abdominal wall.  Ports were placed 8 cm apart

from each other including 15-20 cm away from the target anatomy of the right 

pelvis.  The patient was then placed in Trendelenburg position, at least 14 

down and right side up at least 7.





The robotic da Tony XI system was primed and docked from the left side of the 

patient. 





Using atraumatic graspers and vessel sealer, the robotic system was docked and 

primed as described. Instruments were interchanged by the assistant including 

graspers, robotic stapler and vessel sealer. 





Next, attention was brought to identify the cecum.  





A systematic view within the abdominal cavity was started with the small bowel 

which was unremarkable.  The base of the cecum was unremarkable.   The appendix 

was retrocecal coursing towards right upper quadrant behind the ascending colon 

with additional dissection required.  The body of the appendix was moderately 

dilated with moderate periappendicitis. No perforation was identified.  The 

appendix was dissected free from its surrounding tissues.





Blue 45 mm robotic staple loads were fired along the base of the appendix.  The 

staple line was hemostatic.  Hemostasis was checked prior to undocking the 

robot.





The robot was undocked.  I re-scrubbed into the case.  





The specimen was removed from the abdominal cavity with an Endo Catch bag 

through the 12 mm trocar at the left upper quadrant. 





All instruments and pneumoperitoneum were evacuated from the abdominal cavity.  

Local anesthetic was infiltrated to all wounds for postop analgesia.  All 

incisions were also cleansed with diluted hydrogen peroxide.  The incisions were

closed with 4-0 Monocryl.  Exofin glue was applied to the rest of the skin 

incisions. 





The patient had tolerated the procedure well. The patient was extubated 

successfully. 





The patient was transferred to the postanesthesia care unit in stable condition.







Plan - Discharge Summary


Discharge Rx Participant: No


New Discharge Prescriptions: 


New


   Amoxic-Pot Clav 875-125Mg [Augmentin 875-125] 1 tab PO BID #20 tab


   Ibuprofen [Motrin] 600 mg PO Q8HR PRN #30 tab


     PRN Reason: Pain


   Acetaminophen Tab [Tylenol Tab] 1,000 mg PO Q6HR PRN #30 tablet


     PRN Reason: Pain





Continue


   Lisinopril-Hctz 20-25 mg [Zestoretic 20-25] 1 tab PO QAM


   Semaglutide [Ozempic] 2 mg SQ MO


   Linaclotide [Linzess] 145 mcg PO DAILY


Discharge Medication List





Linaclotide [Linzess] 145 mcg PO DAILY 04/21/23 [History]


Semaglutide [Ozempic] 2 mg SQ MO 04/21/23 [History]


Lisinopril-Hctz 20-25 mg [Zestoretic 20-25] 1 tab PO QAM 06/23/23 [History]


Acetaminophen Tab [Tylenol Tab] 1,000 mg PO Q6HR PRN #30 tablet 07/21/23 [Rx]


Amoxic-Pot Clav 875-125Mg [Augmentin 875-125] 1 tab PO BID #20 tab 07/21/23 [Rx]


Ibuprofen [Motrin] 600 mg PO Q8HR PRN #30 tab 07/21/23 [Rx]








Follow up Appointment(s)/Referral(s): 


Aurelia Tapia MD [STAFF PHYSICIAN] - 08/01/23


Patient Instructions/Handouts:  Laparoscopic Appendectomy (DC)


Activity/Diet/Wound Care/Special Instructions: 


Using antibacterial soap.  No lifting over 10 pounds 2 weeks, Aug 4th





May shower.  





No bathtub soaks for 2 weeks,  Aug 4th





Use ice along incisions for today to prevent swelling.





Take tylenol, aleve/ibuprofen, simethicone scheduled for 3 days for best pain 

relief


Discharge Disposition: HOME SELF-CARE